# Patient Record
Sex: FEMALE | Race: WHITE
[De-identification: names, ages, dates, MRNs, and addresses within clinical notes are randomized per-mention and may not be internally consistent; named-entity substitution may affect disease eponyms.]

---

## 2012-06-17 VITALS — DIASTOLIC BLOOD PRESSURE: 75 MMHG | SYSTOLIC BLOOD PRESSURE: 120 MMHG

## 2018-05-19 NOTE — RAD REPORT
EXAM DESCRIPTION:  US - Abdomen Exam Limited - 5/19/2018 7:38 am

 

CLINICAL HISTORY:  Right upper quadrant pain, abnormal CT study

 

COMPARISON:  CT abdomen May 19th

 

FINDINGS:  Gallbladder is well filled but not abnormally distended. Several small mobile gallstones a
re present under 1 centimeter in size. Gallbladder wall is upper normal to slightly thickened. The tr
ace pericholecystic fluid seen at CT imaging cannot be confirmed on this study. No gallstone is confi
rmed as being fixed in the neck.

 

No common duct stone or biliary tree dilatation identified.

 

IMPRESSION:  Multiple small sub centimeter mobile gallstones are present.

 

Upper normal to slightly thickened gallbladder wall. Pericholecystic fluid seen at CT imaging is not 
confirmed at sonography.

 

No biliary tree abnormality.

## 2018-05-19 NOTE — RAD REPORT
EXAM DESCRIPTION:  RAD - Cholangiogram Oper-Xray Or - 5/19/2018 2:15 pm

 

FINDINGS:  Portable C-arm views were obtained during intraoperative cholangiogram.

 

Biliary tree is not dilated. No duct stone, stricture or mass seen on limited evaluation. Contrast in
 the gallbladder fossa is possibly due to technical factors of injection rather than biloma or bile l
eak. Gallbladder is presumed to the already been removed.

 

Overall assessment is limited when only a single images available for review. Correlation is needed w
ith findings during real-time evaluation.

## 2018-05-19 NOTE — P.OP
Preoperative diagnosis: Acute on chronic cholecystitis with cholelithiasis


Postoperative diagnosis: The same


Primary procedure: Laparoscopic cholecystectomy


Secondary procedure: Cholangiogram


Anesthesia: General


Estimated blood loss: Less than 10 cc


Specimen: 1 gallbladder incontinence


Operative Technique: 





The patient brought the operating room and placed supine on the table.  After 

the induction of adequate general endotracheal anesthesia, the area of the 

abdomen was prepped with a DuraPrep solution and she was draped in usual 

aseptic manner.





A subumbilical incision was made.  This brought down through the skin and 

subcutaneous tissue.  The tissue port was used to enter the peritoneal cavity 

and created pneumoperitoneum to approximately 12 mm of mercury.  Under direct 

vision a 5 mm trocar was placed in the midline, and 2 others on the right 

lateral of the abdominal wall.  With the patient placed in reverse 

Trendelenburg and rolled to the left were able to visualize right upper 

quadrant.  We could see a distended acutely inflamed gallbladder.  There was 

obvious edema in the walls of the gallbladder.  A grasper was placed on the 

fundus, and another down by Erick's pouch.  Once again there was a marked 

amount of edema around Erick's pouch.  The peritoneum was opened this area 

gently dissected off the surrounding structures.  Having applied lateral 

traction obtained no critical view the cystic duct was clipped between the 

gallbladder and the cystic duct.  An opening was made into the cystic duct 

through which we obtained a normal intraoperative cholangiogram.  The catheter 

was removed.  The cystic was secured with 2 clips placed using the Endo clips 

device.  The cystic duct was now fully transected.  The cystic artery was 

clipped and divided in the usual way.  The gallbladder was now dissected from 

the liver bed, placed into an Endo-Catch, and brought out through the umbilical 

trocar site.  At this point the abdomen is inspected to ensure adequate 

hemostasis.  Was returned to the neutral position on the OR table.  The 

umbilical trocar site was approximated using the Endo Close an absorbable 

stitch.  The irrigating fluid was aspirated from the right upper quadrant.  The 

pneumoperitoneum was collapsed, the suture tied, and staples applied to the 

skin.





At the end of the procedure she was stable when sent to the recovery room.  

Needle sponge instrument count were correct.  No drains were placed.


Complications: None


Transferred to: Recovery Room


Condition: Good

## 2018-05-19 NOTE — EDPHYS
Physician Documentation                                                                           

 [facility]                                                                

Name: Lina Cahparro                                                                               

Age: 42 yrs                                                                                       

Sex: Female                                                                                       

: 1975                                                                                   

MRN: [id number]                                                                                   

Arrival Date: 2018                                                                          

Time: 03:38                                                                                       

Account#: [id number]                                                                            

Bed 7                                                                                             

Private MD:                                                                                       

ED Physician Dylan Karimi                                                                      

HPI:                                                                                              

                                                                                             

05:07 This 42 yrs old  Female presents to ER via Ambulatory with complaints of       valery 

      Abdominal Pain, Back Pain.                                                                  

05:07 The patient presents with pain that is acute.                                           valery 

                                                                                                  

OB/GYN:                                                                                           

03:48 LMP 2018                                                                            tl1 

                                                                                                  

Historical:                                                                                       

- Allergies:                                                                                      

03:51 No Known Allergies;                                                                     tl1 

- Home Meds:                                                                                      

03:51 None [Active];                                                                          tl1 

- PMHx:                                                                                           

03:51 None;                                                                                   tl1 

- PSHx:                                                                                           

03:51 Tubal ligation;                                                                         tl1 

                                                                                                  

- Immunization history:: Adult Immunizations up to date.                                          

- Social history:: Smoking status: Patient uses tobacco products, smokes one pack                 

  cigarettes per day. Patient uses alcohol, occasionally.                                         

                                                                                                  

                                                                                                  

ROS:                                                                                              

05:08 Constitutional: Negative for fever, chills, and weight loss, Eyes: Negative for injury, valery 

      pain, redness, and discharge, ENT: Negative for injury, pain, and discharge, Neck:          

      Negative for injury, pain, and swelling, Cardiovascular: Negative for chest pain,           

      palpitations, and edema, Respiratory: Negative for shortness of breath, cough,              

      wheezing, and pleuritic chest pain, Back: Negative for injury and pain, : Negative        

      for injury, bleeding, discharge, and swelling, MS/Extremity: Negative for injury and        

      deformity, Skin: Negative for injury, rash, and discoloration, Neuro: Negative for          

      headache, weakness, numbness, tingling, and seizure, Psych: Negative for depression,        

      anxiety, suicide ideation, homicidal ideation, and hallucinations, Allergy/Immunology:      

      Negative for hives, rash, and allergies, Endocrine: Negative for neck swelling,             

      polydipsia, polyuria, polyphagia, and marked weight changes.                                

05:08 Abdomen/GI: Positive for abdominal pain, of the epigastric area, right upper quadrant       

      and left upper quadrant.                                                                    

                                                                                                  

Exam:                                                                                             

05:08 Constitutional:  This is a well developed, well nourished patient who is awake, alert,  valery 

      and in no acute distress. Head/Face:  Normocephalic, atraumatic. Eyes:  Pupils equal        

      round and reactive to light, extra-ocular motions intact.  Lids and lashes normal.          

      Conjunctiva and sclera are non-icteric and not injected.  Cornea within normal limits.      

      Periorbital areas with no swelling, redness, or edema. ENT:  Nares patent. No nasal         

      discharge, no septal abnormalities noted.  Tympanic membranes are normal and external       

      auditory canals are clear.  Oropharynx with no redness, swelling, or masses, exudates,      

      or evidence of obstruction, uvula midline.  Mucous membranes moist. Neck:  Trachea          

      midline, no thyromegaly or masses palpated, and no cervical lymphadenopathy.  Supple,       

      full range of motion without nuchal rigidity, or vertebral point tenderness.  No            

      Meningismus. Chest/axilla:  Normal chest wall appearance and motion.  Nontender with no     

      deformity.  No lesions are appreciated. Cardiovascular:  Regular rate and rhythm with a     

      normal S1 and S2.  No gallops, murmurs, or rubs.  Normal PMI, no JVD.  No pulse             

      deficits. Respiratory:  Lungs have equal breath sounds bilaterally, clear to                

      auscultation and percussion.  No rales, rhonchi or wheezes noted.  No increased work of     

      breathing, no retractions or nasal flaring. Back:  No spinal tenderness.  No                

      costovertebral tenderness.  Full range of motion. Female :  Normal external               

      genitalia. Skin:  Warm, dry with normal turgor.  Normal color with no rashes, no            

      lesions, and no evidence of cellulitis. MS/ Extremity:  Pulses equal, no cyanosis.          

      Neurovascular intact.  Full, normal range of motion. Neuro:  Awake and alert, GCS 15,       

      oriented to person, place, time, and situation.  Cranial nerves II-XII grossly intact.      

      Motor strength 5/5 in all extremities.  Sensory grossly intact.  Cerebellar exam            

      normal.  Normal gait. Psych:  Awake, alert, with orientation to person, place and time.     

       Behavior, mood, and affect are within normal limits.                                       

05:08 Abdomen/GI: Inspection: abdomen appears normal, Bowel sounds: normal, Palpation: mild       

      abdominal tenderness, moderate abdominal tenderness, in the epigastric area, right          

      upper quadrant and left upper quadrant.                                                     

                                                                                                  

Vital Signs:                                                                                      

03:48  / 83; Pulse 74; Resp 17; Temp 97.6; Pulse Ox 97% ; Weight 72.57 kg; Height 5 ft. tl1 

      7 in. (170.18 cm); Pain 5/10;                                                               

05:00  / 79; Pulse 78; Resp 17; Pulse Ox 100% ; Pain 8/10;                              tl1 

05:43  / 81; Pulse 64; Resp 17; Pulse Ox 100% ; Pain 0/10;                              tl1 

06:19  / 72; Pulse 56; Resp 16; Temp 97.4; Pulse Ox 100% ; Pain 0/10;                   tl1 

07:20  / 71; Pulse 54; Resp 16; Pulse Ox 99% ;                                          jl7 

03:48 Body Mass Index 25.06 (72.57 kg, 170.18 cm)                                             tl1 

                                                                                                  

MDM:                                                                                              

04:24 Patient medically screened.                                                             [facility] 

                                                                                                  

                                                                                             

03:51 Order name: Amylase, Serum; Complete Time: 05:04                                        [facility] 

                                                                                             

03:51 Order name: Basic Metabolic Panel; Complete Time: 05:04                                 [facility] 

                                                                                             

03:51 Order name: CBC with Diff; Complete Time: 05:04                                         [facility] 

                                                                                             

03:51 Order name: Hepatic Function; Complete Time: 05:04                                      [facility] 

                                                                                             

03:51 Order name: Lipase; Complete Time: 05:04                                                [facility] 

                                                                                             

03:51 Order name: Urine Microscopic Only; Complete Time: 05:04                                [facility] 

                                                                                             

04:09 Order name: Urine Dipstick--Ancillary (enter results); Complete Time: 05:04               

                                                                                             

04:09 Order name: Urine Pregnancy--Ancillary (enter results); Complete Time: 05:04              

                                                                                             

05:15 Order name: Basic Metabolic Panel                                                       [facility]

                                                                                             

05:15 Order name: Basic Metabolic Panel                                                       [facility]

                                                                                             

05:15 Order name: CBC with Automated Diff                                                     [facility]

                                                                                             

05:15 Order name: CBC with Automated Diff                                                     [facility]

                                                                                             

05:15 Order name: Lipase                                                                      [facility]

                                                                                             

03:51 Order name: IV Saline Lock; Complete Time: 03:56                                        [facility] 

                                                                                             

05:07 Order name: CT Abd/Pelvis - W/Contrast                                                  [facility] 

                                                                                             

05:07 Order name: US Abdomen Limited                                                          [facility] 

                                                                                             

05:15 Order name: NPO                                                                         [facility]

                                                                                             

05:15 Order name: Lipase                                                                      [facility]

                                                                                             

05:15 Order name: Liver (Hepatic) Function                                                    [facility]

                                                                                             

05:15 Order name: Liver (Hepatic) Function                                                    [facility]

                                                                                             

03:51 Order name: Labs collected and sent; Complete Time: 03:56                               tl1 

                                                                                                  

Administered Medications:                                                                         

05:08 CANCELLED (not available): Zofran 4 mg IVP once; over 2 minutes                         tl1 

05:08 CANCELLED (Duplicate Order): NS 0.9% 1000 ml IV at 1000 ml once                         tl1 

05:08 CANCELLED (Duplicate Order): morphine 4 mg IVP once                                     tl1 

05:09 Drug: NS 0.9% 1000 ml Route: IV; Rate: 1 bolus; Site: right antecubital;                tl1 

06:09 Follow up: IV Status: Completed infusion                                                tl1 

05:09 Drug: Phenergan 12.5 mg Route: IVP; Infused Over: 3 mins; Site: right antecubital;      tl1 

05:44 Follow up: Response: No adverse reaction; Marked relief of symptoms; Nausea is decreasedtl1 

05:10 Drug: morphine 4 mg Route: IVP; Site: right antecubital;                                tl1 

05:45 Follow up: Response: No adverse reaction; Marked relief of symptoms; Pain is decreased  tl1 

05:16 Drug: Pepcid 20 mg Route: IVP; Infused Over: 3 mins; Site: right antecubital;           tl1 

05:45 Follow up: Response: No adverse reaction; No change in condition                        tl1 

05:16 Drug: Zosyn 3.375 grams Route: IVPB; Infused Over: 60 mins; Site: right antecubital;    tl1 

06:09 Follow up: IV Status: Completed infusion                                                tl1 

06:18 Drug: NS 0.9% 1000 ml Route: IV; Rate: 125 ml/hr; Site: right antecubital;              tl1 

06:21 Follow up: IV Status: Infusion continued upon admission                                 tl1 

                                                                                                  

                                                                                                  

Disposition:                                                                                      

18 05:10 Hospitalization ordered by Bill Hairston for Observation. Preliminary             

  diagnosis are Abdominal tenderness, Upper abdominal pain, unspecified.                          

- Bed requested for Telemetry/MedSurg (Inpatient).                                                

- Status is Observation.                                                                      jl7 

- Condition is Fair.                                                                              

- Problem is new.                                                                                 

- Symptoms have improved.                                                                         

UTI on Admission? No                                                                              

                                                                                                  

                                                                                                  

                                                                                                  

Signatures:                                                                                       

Dispatcher [employer]                           EDMS                                                 

Danna Rogers RN RN kl Anderson, Corey, MD MD cha Lasagna, Tonya, RN RN   tl1                                                  

Karl Orta RN RN   jl7                                                  

                                                                                                  

Corrections: (The following items were deleted from the chart)                                    

05:07 03:52 Creatinine for Radiology+C.LAB.BRZ ordered. EDMS                                  EDMS

05:08 05:07 Zofran 4 mg IVP once; over 2 minutes ordered. valery                                 tl1 

05:08 05:07 NS 0.9% 1000 ml IV at 1000 ml once ordered. tl1                                   tl1 

05:08 05:07 morphine 4 mg IVP once ordered. tl1                                               tl1 

06:03 05:10 Hospitalization Ordered by Bill Hairston MD for Observation. Preliminary          kl  

      diagnosis is Abdominal tenderness; Upper abdominal pain, unspecified. Bed requested for     

      Telemetry/MedSurg (Inpatient). Status is Observation. Condition is Fair. Problem is         

      new. Symptoms have improved. UTI on Admission? No. valery                                      

08:00 06:03 2018 05:10 Hospitalization Ordered by Bill Hairston MD for Observation.     jl7 

      Preliminary diagnosis is Abdominal tenderness; Upper abdominal pain, unspecified. Bed       

      requested for Telemetry/MedSurg (Inpatient). Status is Observation. Condition is Fair.      

      Problem is new. Symptoms have improved. UTI on Admission? No. kl                            

                                                                                                  

**************************************************************************************************

## 2018-05-19 NOTE — RAD REPORT
EXAM DESCRIPTION:  CT - Abdomen   Pelvis W Contrast - 5/19/2018 6:42 am

 

CLINICAL HISTORY:  Abdominal pain

 

 A preliminary written report was provided at the time of the study, and the report was reviewed prio
r to final dictation.

 

COMPARISON:  None.

 

TECHNIQUE:  Biphasic, helical CT imaging of the abdomen and pelvis was performed following 100 ml non
-ionic IV contrast. No oral contrast was given.

 

All CT scans are performed using dose optimization technique as appropriate and may include automated
 exposure control or mA/KV adjustment according to patient size.

 

FINDINGS:  No suspicious findings in the lung bases.

 

The liver, spleen, and pancreas show no suspicious findings. Gallbladder is well filled but not dilat
ed. Gallbladder wall is minimally edematous and there is a trace amount of pericholecystic fluid. No 
biliary tree dilatation.

 

Symmetric renal function is seen with no hydronephrosis or suspicious renal mass. No pyelonephritis o
r acute renal parenchymal process. Urinary bladder, uterus and ovaries show no suspicious findings.

 

Food fills but does not abnormally distend the stomach. No true gastric wall thickening or mass suspe
cted. Multiple fluid filled but nondilated small bowel loops are present. Moderate stool volume is pr
esent without an acute colon process seen. No appendicitis findings.

 

  No free air or pneumatosis. No focal inflammatory stranding. Trace amount of fluid in the cul-de-sa
c is within physiologic limits.  No hernia, mass or bulky lymphadenopathy. No adrenal abnormality.

 

No suspicious bony findings.

 

 

IMPRESSION:  Gallbladder is well filled but not dilated. There is trace pericholecystic fluid and que
stionable gallbladder wall edema. Gallstones can be occult.

 

Biliary tree and pancreas are unremarkable. No other right upper quadrant finding.

 

Fluid-filled small bowel loops are present. These are not clearly abnormal although a small bowel ent
eritis would still be possible.

## 2018-05-19 NOTE — ER
Nurse's Notes                                                                                     

 [facility]                                                                

Name: Lina Chaparro                                                                               

Age: 42 yrs                                                                                       

Sex: Female                                                                                       

: 1975                                                                                   

MRN: [id number]                                                                                   

Arrival Date: 2018                                                                          

Time: 03:38                                                                                       

Account#: [id number]                                                                            

Bed 7                                                                                             

Private MD:                                                                                       

Diagnosis: Abdominal tenderness;Upper abdominal pain, unspecified                                 

                                                                                                  

Presentation:                                                                                     

                                                                                             

03:47 Presenting complaint: Patient states: After dinner tonight I got pain in my upper       tl1 

      stomach that radiated around my right back. Transition of care: patient was not             

      received from another setting of care. Onset of symptoms was May 19, 2018. Initial          

      Sepsis Screen: Does the patient meet any 2 criteria? No. Patient's initial sepsis           

      screen is negative. Does the patient have a suspected source of infection? No.              

      Patient's initial sepsis screen is negative. Care prior to arrival: None.                   

03:47 Method Of Arrival: Ambulatory                                                           tl1 

03:47 Acuity: ARAMIS 3                                                                           tl1 

                                                                                                  

OB/GYN:                                                                                           

03:48 LMP 2018                                                                            tl1 

                                                                                                  

Historical:                                                                                       

- Allergies:                                                                                      

03:51 No Known Allergies;                                                                     tl1 

- Home Meds:                                                                                      

03:51 None [Active];                                                                          tl1 

- PMHx:                                                                                           

03:51 None;                                                                                   tl1 

- PSHx:                                                                                           

03:51 Tubal ligation;                                                                         tl1 

                                                                                                  

- Immunization history:: Adult Immunizations up to date.                                          

- Social history:: Smoking status: Patient uses tobacco products, smokes one pack                 

  cigarettes per day. Patient uses alcohol, occasionally.                                         

                                                                                                  

                                                                                                  

Screenin:54 Abuse screen: Denies threats or abuse. Denies injuries from another. Nutritional        tl1 

      screening: No deficits noted. Tuberculosis screening: No symptoms or risk factors           

      identified. Fall Risk IV access (20 points).                                                

                                                                                                  

Assessment:                                                                                       

03:52 General: Appears in no apparent distress. Behavior is calm, cooperative, appropriate    tl1 

      for age. Pain: Complains of pain in epigastric area Pain radiates to back Pain              

      currently is 5 out of 10 on a pain scale. Quality of pain is described as burning.          

      Neuro: Level of Consciousness is awake, alert, obeys commands, Oriented to person,          

      place, time, situation. Cardiovascular: Denies chest pain. Respiratory: Airway is           

      patent Trachea midline Respiratory effort is even, unlabored, Breath sounds are clear       

      bilaterally. GI: Abdomen is non-distended, Bowel sounds present X 4 quads. Abd is soft      

      X 4 quads Abd is non tender X 4 quads Reports upper abdominal pain. : No signs and/or     

      symptoms were reported regarding the genitourinary system. EENT: No signs and/or            

      symptoms were reported regarding the EENT system. Derm: No signs and/or symptoms            

      reported regarding the dermatologic system. Musculoskeletal: No signs and/or symptoms       

      reported regarding the musculoskeletal system.                                              

05:44 Reassessment: Patient and/or family updated on plan of care and expected duration. Pain tl1 

      level reassessed. Patient is alert, oriented x 3, equal unlabored respirations, skin        

      warm/dry/pink. Patient denies pain at this time. Patient states feeling better. Patient     

      states symptoms have improved.                                                              

                                                                                                  

Vital Signs:                                                                                      

03:48  / 83; Pulse 74; Resp 17; Temp 97.6; Pulse Ox 97% ; Weight 72.57 kg; Height 5 ft. tl1 

      7 in. (170.18 cm); Pain 5/10;                                                               

05:00  / 79; Pulse 78; Resp 17; Pulse Ox 100% ; Pain 8/10;                              tl1 

05:43  / 81; Pulse 64; Resp 17; Pulse Ox 100% ; Pain 0/10;                              tl1 

06:19  / 72; Pulse 56; Resp 16; Temp 97.4; Pulse Ox 100% ; Pain 0/10;                   tl1 

07:20  / 71; Pulse 54; Resp 16; Pulse Ox 99% ;                                          jl7 

03:48 Body Mass Index 25.06 (72.57 kg, 170.18 cm)                                             tl1 

                                                                                                  

ED Course:                                                                                        

03:38 Patient arrived in ED.                                                                  es  

03:46 Karma Wharton, RN is Primary Nurse.                                                    tl1 

03:48 Triage completed.                                                                       tl1 

03:51 Arm band placed on right wrist.                                                         tl1 

03:51 Patient has correct armband on for positive identification. Bed in low position. Call   tl1 

      light in reach. Side rails up X 1. Adult w/ patient.                                        

03:56 Inserted saline lock: 20 gauge in right antecubital area, using aseptic technique.      tl2 

      Blood collected.                                                                            

04:03 Dylan Karimi MD is Attending Physician.                                             valery 

05:09 Bill Hairston MD is Hospitalizing Provider.                                           valery 

05:31 Patient moved to CT via stretcher.                                                      nj  

05:34 CT completed. Patient tolerated procedure well. Patient moved back from CT.             nj  

06:20 No provider procedures requiring assistance completed. Patient admitted, IV remains in  tl1 

      place.                                                                                      

07:38 Ultrasound completed. Patient tolerated well.                                           sg3 

                                                                                                  

Administered Medications:                                                                         

05:08 CANCELLED (not available): Zofran 4 mg IVP once; over 2 minutes                         tl1 

05:08 CANCELLED (Duplicate Order): NS 0.9% 1000 ml IV at 1000 ml once                         tl1 

05:08 CANCELLED (Duplicate Order): morphine 4 mg IVP once                                     tl1 

05:09 Drug: NS 0.9% 1000 ml Route: IV; Rate: 1 bolus; Site: right antecubital;                tl1 

06:09 Follow up: IV Status: Completed infusion                                                tl1 

05:09 Drug: Phenergan 12.5 mg Route: IVP; Infused Over: 3 mins; Site: right antecubital;      tl1 

05:44 Follow up: Response: No adverse reaction; Marked relief of symptoms; Nausea is decreasedtl1 

05:10 Drug: morphine 4 mg Route: IVP; Site: right antecubital;                                tl1 

05:45 Follow up: Response: No adverse reaction; Marked relief of symptoms; Pain is decreased  tl1 

05:16 Drug: Pepcid 20 mg Route: IVP; Infused Over: 3 mins; Site: right antecubital;           tl1 

05:45 Follow up: Response: No adverse reaction; No change in condition                        tl1 

05:16 Drug: Zosyn 3.375 grams Route: IVPB; Infused Over: 60 mins; Site: right antecubital;    tl1 

06:09 Follow up: IV Status: Completed infusion                                                tl1 

06:18 Drug: NS 0.9% 1000 ml Route: IV; Rate: 125 ml/hr; Site: right antecubital;              tl1 

06:21 Follow up: IV Status: Infusion continued upon admission                                 tl1 

                                                                                                  

                                                                                                  

Outcome:                                                                                          

05:10 Decision to Hospitalize by Provider.                                                    valery 

07:46 Admitted to Med/surg accompanied by tech, via wheelchair, room 430, with chart, Report  jl7 

      called to  MYLES Gooden                                                                        

07:46 Condition: stable                                                                           

07:46 Discharge instructions given to patient, Instructed on the need for admit, Demonstrated     

      understanding of instructions.                                                              

08:00 Patient left the ED.                                                                    jl7 

                                                                                                  

Signatures:                                                                                       

Dylan Karimi MD MD cha Salyer, Edna es Lasagna, Tonya, RN                      RN   tl1                                                  

Alma Booth RN                        RN   tl2                                                  

Randolph Lane Jahala, RN                        RN   jl7                                                  

Blanka Recio                               sg3                                                  

                                                                                                  

**************************************************************************************************

## 2019-02-10 ENCOUNTER — HOSPITAL ENCOUNTER (EMERGENCY)
Dept: HOSPITAL 97 - ER | Age: 44
Discharge: ANOTHER HOSPITAL | End: 2019-02-10
Payer: COMMERCIAL

## 2019-02-10 VITALS — TEMPERATURE: 97.2 F | OXYGEN SATURATION: 100 %

## 2019-02-10 VITALS — DIASTOLIC BLOOD PRESSURE: 65 MMHG | SYSTOLIC BLOOD PRESSURE: 131 MMHG

## 2019-02-10 DIAGNOSIS — R53.1: ICD-10-CM

## 2019-02-10 DIAGNOSIS — R29.810: ICD-10-CM

## 2019-02-10 DIAGNOSIS — R29.702: ICD-10-CM

## 2019-02-10 DIAGNOSIS — I63.9: Primary | ICD-10-CM

## 2019-02-10 LAB
BUN BLD-MCNC: 8 MG/DL (ref 7–18)
GLUCOSE SERPLBLD-MCNC: 118 MG/DL (ref 74–106)
HCT VFR BLD CALC: 42.7 % (ref 36–45)
INR BLD: 1
LYMPHOCYTES # SPEC AUTO: 2.2 K/UL (ref 0.7–4.9)
PMV BLD: 7.9 FL (ref 7.6–11.3)
POTASSIUM SERPL-SCNC: 3.5 MMOL/L (ref 3.5–5.1)
RBC # BLD: 4.64 M/UL (ref 3.86–4.86)

## 2019-02-10 PROCEDURE — 71045 X-RAY EXAM CHEST 1 VIEW: CPT

## 2019-02-10 PROCEDURE — 81003 URINALYSIS AUTO W/O SCOPE: CPT

## 2019-02-10 PROCEDURE — 36415 COLL VENOUS BLD VENIPUNCTURE: CPT

## 2019-02-10 PROCEDURE — 81025 URINE PREGNANCY TEST: CPT

## 2019-02-10 PROCEDURE — 93005 ELECTROCARDIOGRAM TRACING: CPT

## 2019-02-10 PROCEDURE — 80048 BASIC METABOLIC PNL TOTAL CA: CPT

## 2019-02-10 PROCEDURE — 99285 EMERGENCY DEPT VISIT HI MDM: CPT

## 2019-02-10 PROCEDURE — 85025 COMPLETE CBC W/AUTO DIFF WBC: CPT

## 2019-02-10 PROCEDURE — 82962 GLUCOSE BLOOD TEST: CPT

## 2019-02-10 PROCEDURE — 85610 PROTHROMBIN TIME: CPT

## 2019-02-10 PROCEDURE — 85730 THROMBOPLASTIN TIME PARTIAL: CPT

## 2019-02-10 PROCEDURE — 70450 CT HEAD/BRAIN W/O DYE: CPT

## 2019-02-10 NOTE — EKG
Test Date:    2019-02-10               Test Time:    16:29:06

Technician:                                       

                                                     

MEASUREMENT RESULTS:                                       

Intervals:                                           

Rate:         90                                     

AR:           140                                    

QRSD:         90                                     

QT:           386                                    

QTc:          472                                    

Axis:                                                

P:            57                                     

AR:           140                                    

QRS:          22                                     

T:            11                                     

                                                     

INTERPRETIVE STATEMENTS:                                       

                                                     

Normal sinus rhythm

Nonspecific ST abnormality

Abnormal ECG

No previous ECG available for comparison



Electronically Signed On 02-10-19 21:49:28 CST by Nicholas Gomez

## 2019-02-10 NOTE — EDPHYS
Physician Documentation                                                                           

 [facility]                                                                

Name: Lina Chaparro                                                                               

Age: 43 yrs                                                                                       

Sex: Female                                                                                       

: 1975                                                                                   

MRN: [id number]                                                                                   

Arrival Date: 02/10/2019                                                                          

Time: 15:57                                                                                       

Account#: [id number]                                                                            

Bed 8                                                                                             

Private MD: None, None                                                                            

ED Physician Sloan Calles                                                                         

HPI:                                                                                              

02/10                                                                                             

17:28 This 43 yrs old  Female presents to ER via Ambulatory with complaints of       rn  

      Numbness Of Arm.                                                                            

17:29 The patient presents to the emergency department with paresthesias of the. Onset: The   rn  

      symptoms/episode began/occurred at 15:15. Context: occurred at home. Severity of            

      symptoms: At their worst the symptoms were moderate in the emergency department the         

      symptoms have improved. Current symptoms: paralysis or paresis. The patient has not         

      experienced similar symptoms in the past. Reports onset at 1515, left facial numbness       

      and left arm numb, left leg numb and feels heavy, symptoms have improved but not            

      totally resolved. NO trauma, no chest pain. .                                               

                                                                                                  

Historical:                                                                                       

- Allergies:                                                                                      

16:23 No Known Allergies;                                                                     hb  

- Home Meds:                                                                                      

16:23 None [Active];                                                                          hb  

- PMHx:                                                                                           

16:23 None;                                                                                   hb  

- PSHx:                                                                                           

16:23 Tubal ligation;                                                                         hb  

                                                                                                  

- Immunization history:: Adult Immunizations up to date.                                          

- Social history:: Smoking status: Patient/guardian denies using tobacco.                         

- Ebola Screening: : No symptoms or risks identified at this time.                                

- Family history:: not pertinent.                                                                 

- Hospitalizations: : No recent hospitalization is reported.                                      

                                                                                                  

                                                                                                  

ROS:                                                                                              

17:29 Constitutional: Negative for fever, chills, and weight loss, Eyes: Negative for injury, rn  

      pain, redness, and discharge, Cardiovascular: Negative for chest pain, palpitations,        

      and edema, Respiratory: Negative for shortness of breath, cough, wheezing, and              

      pleuritic chest pain, Abdomen/GI: Negative for abdominal pain, nausea, vomiting,            

      diarrhea, and constipation, MS/Extremity: Negative for injury and deformity, Skin:          

      Negative for injury, rash, and discoloration, Neuro: + numbness and weakness                

                                                                                                  

Exam:                                                                                             

17:29 Constitutional:  This is a well developed, well nourished patient who is awake, alert,  rn  

      appears anxious Head/Face:  Normocephalic, atraumatic. Eyes:  Pupils equal round and        

      reactive to light, extra-ocular motions intact.  Lids and lashes normal.  Conjunctiva       

      and sclera are non-icteric and not injected.  Cornea within normal limits.  Periorbital     

      areas with no swelling, redness, or edema. Cardiovascular:  Regular rate and rhythm.        

      No pulse deficits. Respiratory:  Lungs have equal breath sounds bilaterally, clear to       

      auscultation.  No increased work of breathing, no retractions or nasal flaring.             

      Abdomen/GI:  soft, non-tender Skin:  Warm, dry with normal turgor.  Normal color with       

      no rashes, no lesions, and no evidence of cellulitis. MS/ Extremity:  Pulses equal, no      

      cyanosis.  Neurovascular intact.  Full, normal range of motion.  Equal circumference.       

      Neuro:  Awake and alert, GCS 15, oriented to person, place, time, and situation.  +         

      mild left lower facial droop, + paresthesias to soft touch LUE, normal strength without     

      drift.                                                                                      

                                                                                                  

Vital Signs:                                                                                      

16:08  / 78; Pulse 99; Resp 16; Temp 97.2; Pulse Ox 100% on R/A; Pain 0/10;             hb  

16:30 Weight 88.8 kg (M);                                                                     ss  

16:30  / 88; Pulse 77; Resp 12; Pulse Ox 100% ;                                         sv  

17:00  / 80; Pulse 79; Resp 14; Pulse Ox 100% ;                                         sv  

17:30  / 71; Pulse 70; Resp 14; Pulse Ox 100% ;                                         sv  

18:07  / 65; Pulse 77; Resp 12; Pulse Ox 100% ;                                         sv  

                                                                                                  

NIH Stroke Scale Scores:                                                                          

16:18 NIHSS Score: 1                                                                          sv  

16:30 NIHSS Score: 1                                                                          rn  

16:58 NIHSS Score: 2                                                                          rn  

                                                                                                  

MDM:                                                                                              

16:16 Patient medically screened.                                                             rn  

16:30 ED course: NIH scale 1, onset was 1 hour prior to arrival, symptoms present and         rn  

      consented for TPA. CT head no acute finding per radiology. .                                

16:58 ED course: Correction: NIH scale 2, for minor facial paralysis. Patient still does not  rn  

      want TPA, understands implications..                                                        

17:18 ED course: Pt accepted for transfer to [facility] by Dr. Yanes, confirmed again, does    rn  

      not want TPA. .                                                                             

17:29 Data reviewed: vital signs, nurses notes, lab test result(s), EKG, radiologic studies,  rn  

      and as a result, I will admit patient. Counseling: I had a detailed discussion with the     

      patient and/or guardian regarding: the historical points, exam findings, and any            

      diagnostic results supporting the discharge/admit diagnosis, lab results, radiology         

      results, the need to transfer to another facility, [facility] does        

      not immediately have the required specialist.                                               

                                                                                                  

02/10                                                                                             

16:26 Order name: Basic Metabolic Panel; Complete Time: 16:52                                 em1 

02/10                                                                                             

16:26 Order name: CBC with Diff; Complete Time: 16:52                                         em1 

02/10                                                                                             

16:26 Order name: Protime (+inr); Complete Time: 16:52                                        em1 

02/10                                                                                             

16:26 Order name: Ptt, Activated; Complete Time: 16:52                                        em1 

02/10                                                                                             

16:58 Order name: Urine Dipstick--Ancillary (enter results)                                   em1 

02/10                                                                                             

16:58 Order name: Urine Pregnancy--Ancillary (enter results)                                  em1 

02/10                                                                                             

16:26 Order name: CT Stroke Brain w/o Contrast; Complete Time: 17:17                          em1 

02/10                                                                                             

16:26 Order name: Stroke CXR 1 View; Complete Time: 17:17                                     em1 

02/10                                                                                             

16:26 Order name: EKG; Complete Time: 16:27                                                   em1 

02/10                                                                                             

16:26 Order name: Accucheck; Complete Time: 16:42                                             em1 

02/10                                                                                             

16:26 Order name: Cardiac monitoring; Complete Time: 16:42                                    em1 

02/10                                                                                             

16:26 Order name: EKG - Nurse/Tech; Complete Time: 16:43                                      em1 

02/10                                                                                             

16:26 Order name: IV Saline Lock; Complete Time: 16:32                                        em1 

02/10                                                                                             

16:26 Order name: Labs collected and sent; Complete Time: 16:32                               em1 

02/10                                                                                             

16:26 Order name: NPO; Complete Time: 16:32                                                   em1 

02/10                                                                                             

16:26 Order name: O2 Per Protocol; Complete Time: 16:32                                       em1 

02/10                                                                                             

16:26 Order name: O2 Sat Monitoring; Complete Time: 16:31                                     em1 

02/10                                                                                             

16:26 Order name: Stroke Swallow Screen; Complete Time: 18:00                                 em 

                                                                                                  

Administered Medications:                                                                         

17:22 Not Given (Patient Refused): ACTIvase IV Thrombolytics at calculated rate Per protocol  rn  

      over 60 mins; 0.9 mg/kg IV (Max: 90 mg); give 10% of the total dose as an IV bolus over     

      1 minute, then give the remaining 90% as an IV infusion over 60 minutes                     

18:39 Drug: PlaVIX 75 mg Route: PO;                                                             

18:39 Follow up: Response: Medication administered at discharge.                                

                                                                                                  

                                                                                                  

Point of Care Testing:                                                                            

      Blood Glucose:                                                                              

16:09 Blood Glucose: 111 mg/dL;                                                               hb  

      Ranges:                                                                                     

      Critical Glucose Levels:Adult <50 mg/dl or >400 mg/dl  <40 mg/dl or >180 mg/dl       

Disposition:                                                                                      

02/10/19 17:32 Transfer ordered to [facility]. Diagnosis are Ischemic         

  stroke, Paresthesia of skin, Weakness.                                                          

- Reason for transfer: Higher level of care.                                                      

- Accepting physician is Dr. Yanes.                                                              

- Condition is Stable.                                                                            

- Problem is new.                                                                                 

- Symptoms have improved.                                                                         

                                                                                                  

                                                                                                  

                                                                                                  

                                                                                                  

NIH Stroke Scale - NIH Stroke Score                                                               

Date: 02/10/2019                                                                                  

Time: 16:18                                                                                       

Total Score = 1                                                                                   

  1a. Level of Consciousness (LOC) - 0(Alert)                                                     

  1b. Level of Consciousness (LOC) (Year \T\ Age) - 0(Both)                                       

  1c. LOC Commands (Open \T\ Closes Eyes/) - 0(Both)                                          

   2. Best Gaze (Lateral Gaze Paresis) - 0(Normal)                                                

   3. Visual Field Loss - 0(No visual loss)                                                       

   4. Facial Palsy - 0(Normal)                                                                    

  5a. Left Arm: Motor (10-second hold) - 0(No drift)                                              

  5b. Right Arm: Motor (10-second hold) - 0(No drift)                                             

  6a. Left Leg: Motor (5-second hold - always test supine) - 0(No drift)                          

  6b. Right Leg: Motor (5-second hold - always test supine) - 0(No drift)                         

   7. Limb Ataxia (finger/nose \T\ heel/shin - test with eyes open) - 0(Absent)                   

   8. Sensory Loss (pinprick arms/legs/face) - 1(Mild to moderate loss)                           

   9. Best Language: Aphasia (description/naming/reading) - 0(No aphasia)                         

  10. Dysarthria (speech clarity - read or repeat words) - 0(Normal)                              

  11. Extinction and Inattention (visual/tactile/auditory/spatial/personal) - 0(No                

      abnormality)                                                                                

Initials:                                                                                       

                                                                                                  

                                                                                                  

NIH Stroke Scale - NIH Stroke Score                                                               

Date: 02/10/2019                                                                                  

Time: 16:30                                                                                       

Total Score = 1                                                                                   

  1a. Level of Consciousness (LOC) - 0(Alert)                                                     

  1b. Level of Consciousness (LOC) (Year \T\ Age) - 0(Both)                                       

  1c. LOC Commands (Open \T\ Closes Eyes/) - 0(Both)                                          

   2. Best Gaze (Lateral Gaze Paresis) - 0(Normal)                                                

   3. Visual Field Loss - 0(No visual loss)                                                       

   4. Facial Palsy - 0(Normal)                                                                    

  5a. Left Arm: Motor (10-second hold) - 0(No drift)                                              

  5b. Right Arm: Motor (10-second hold) - 0(No drift)                                             

  6a. Left Leg: Motor (5-second hold - always test supine) - 0(No drift)                          

  6b. Right Leg: Motor (5-second hold - always test supine) - 0(No drift)                         

   7. Limb Ataxia (finger/nose \T\ heel/shin - test with eyes open) - 0(Absent)                   

   8. Sensory Loss (pinprick arms/legs/face) - 1(Mild to moderate loss)                           

   9. Best Language: Aphasia (description/naming/reading) - 0(No aphasia)                         

  10. Dysarthria (speech clarity - read or repeat words) - 0(Normal)                              

  11. Extinction and Inattention (visual/tactile/auditory/spatial/personal) - 0(No                

      abnormality)                                                                                

Initials: rn                                                                                      

                                                                                                  

                                                                                                  

NIH Stroke Scale - NIH Stroke Score                                                               

Date: 02/10/2019                                                                                  

Time: 16:58                                                                                       

Total Score = 2                                                                                   

  1a. Level of Consciousness (LOC) - 0(Alert)                                                     

  1b. Level of Consciousness (LOC) (Year \T\ Age) - 0(Both)                                       

  1c. LOC Commands (Open \T\ Closes Eyes/) - 0(Both)                                          

   2. Best Gaze (Lateral Gaze Paresis) - 0(Normal)                                                

   3. Visual Field Loss - 0(No visual loss)                                                       

   4. Facial Palsy - 1(Minor Paralysis)                                                           

  5a. Left Arm: Motor (10-second hold) - 0(No drift)                                              

  5b. Right Arm: Motor (10-second hold) - 0(No drift)                                             

  6a. Left Leg: Motor (5-second hold - always test supine) - 0(No drift)                          

  6b. Right Leg: Motor (5-second hold - always test supine) - 0(No drift)                         

   7. Limb Ataxia (finger/nose \T\ heel/shin - test with eyes open) - 0(Absent)                   

   8. Sensory Loss (pinprick arms/legs/face) - 1(Mild to moderate loss)                           

   9. Best Language: Aphasia (description/naming/reading) - 0(No aphasia)                         

  10. Dysarthria (speech clarity - read or repeat words) - 0(Normal)                              

  11. Extinction and Inattention (visual/tactile/auditory/spatial/personal) - 0(No                

      abnormality)                                                                                

Initials: rn                                                                                      

                                                                                                  

Signatures:                                                                                       

Dispatcher [employer]                           Priya Suarez RN RN                                                      

Sloan Callse MD MD rn Martinez, Eric                               [facility]                                                  

Meme Smith RN RN                                                      

                                                                                                  

Corrections: (The following items were deleted from the chart)                                    

18:54 17:32 02/10/2019 17:32 Transfer ordered to [facility].      sv          

      Diagnosis is Ischemic stroke; Paresthesia of skin; Weakness. Reason for                     

      transfer: Higher level of care. Accepting physician is Dr. Yanes. Condition is             

      Stable. Problem is new. Symptoms have improved. rn                                          

                                                                                                  

**************************************************************************************************

## 2019-02-10 NOTE — RAD REPORT
EXAM DESCRIPTION:  CT - Ct Stroke Brain Wo Cont - 2/10/2019 4:33 pm

 

CLINICAL HISTORY:  Left-sided numbness and tingling, stroke protocol study

 

CLINICAL HISTORY:  None.

 

TECHNIQUE:  Axial 5 millimeter thick images of the head were obtained without IV contrast.

 

All CT scans are performed using dose optimization technique as appropriate and may include automated
 exposure control or mA/KV adjustment according to patient size.

 

FINDINGS:  No intracranial hemorrhage, mass, or cerebral edema. No acute infarction identifiable. No 
extra-axial fluid collections.  Gray matter-white matter differentiation is preserved.

 

 

Visualized portions of the mastoid air cells, paranasal sinuses, and orbits are unremarkable.

 

Findings telephoned to the referring clinician 4:17 p.m.

 

IMPRESSION:  No CT evidence of acute intracranial process.

 

Continued concerns for acute CVA can be addressed with MR imaging.

## 2019-02-10 NOTE — ER
Nurse's Notes                                                                                     

 [facility]                                                                

Name: Lina Chaparro                                                                               

Age: 43 yrs                                                                                       

Sex: Female                                                                                       

: 1975                                                                                   

MRN: [id number]                                                                                   

Arrival Date: 02/10/2019                                                                          

Time: 15:57                                                                                       

Account#: [id number]                                                                            

Bed 8                                                                                             

Private MD: None, None                                                                            

Diagnosis: Ischemic stroke;Paresthesia of skin;Weakness                                           

                                                                                                  

Presentation:                                                                                     

02/10                                                                                             

16:08 Presenting complaint: Left sided facial numbness, left arm and top of left thigh        hb  

      numbness that started at 1515. - facial droop, - slurred speech, - arm drift, bilat         

       strong and equal, ambulated to triage with steady gait. Transition of care:           

      patient was not received from another setting of care. Onset of symptoms was February       

      10, 2019 at 15:15. Risk Assessment: Do you want to hurt yourself or someone else?           

      Patient reports no desire to harm self or others. Care prior to arrival: Medication(s)      

      given: ASA, 81 mg.                                                                          

16:08 Method Of Arrival: Ambulatory                                                           hb  

16:08 Acuity: ARAMIS 2                                                                           hb  

16:20 Initial Sepsis Screen: Does the patient meet any 2 criteria? No. Patient's initial      sv  

      sepsis screen is negative. Does the patient have a suspected source of infection? No.       

      Patient's initial sepsis screen is negative.                                                

                                                                                                  

Historical:                                                                                       

- Allergies:                                                                                      

16:23 No Known Allergies;                                                                     hb  

- Home Meds:                                                                                      

16:23 None [Active];                                                                          hb  

- PMHx:                                                                                           

16:23 None;                                                                                   hb  

- PSHx:                                                                                           

16:23 Tubal ligation;                                                                         hb  

                                                                                                  

- Immunization history:: Adult Immunizations up to date.                                          

- Social history:: Smoking status: Patient/guardian denies using tobacco.                         

- Ebola Screening: : No symptoms or risks identified at this time.                                

- Family history:: not pertinent.                                                                 

- Hospitalizations: : No recent hospitalization is reported.                                      

                                                                                                  

                                                                                                  

Screenin:22 Abuse screen: Denies threats or abuse. Denies injuries from another. Nutritional        hb  

      screening: No deficits noted. Tuberculosis screening: No symptoms or risk factors           

      identified. Fall Risk None identified.                                                      

16:25 Patient has been NPO before screening. The patient is alert, able to follow commands.   sv  

      The patient does not exhibit slurred or garbled speech The patient is not exhibiting        

      difficulty speaking. The patient does not exhibit difficulty understanding words. The       

      patient is able to swallow own secretions with no drooling or need for suction. Patient     

      tolerated one teaspoon of water. No drooling, immediate coughing, gurgling, or clearing     

      of the throat was noted. The patient tolerated 90mL of water. No drooling, immediate        

      coughing, gurgling, or clearing of the throat was noted. The patient passed the bedside     

      swallow screening. Oral medications may be given as ordered. Contact Physician for          

      further diet orders. Provider notified of bedside swallow screening results: Sloan Calles MD.                                                                                   

                                                                                                  

Assessment:                                                                                       

16:08 Reassessment: .                                                                  hb  

16:09 Reassessment: [facility] called, pt transported to CT via wheelchair with Meme BUENO.  hb  

16:15 Reassessment: Pt returned from CT.                                                      hb  

16:16 Reassessment: Dr. Calles at bedside.                                                     hb  

16:37 Reassessment: Pt had a change of mind about TPA administration. Initially after going   ss  

      over risks and possible benefits involved patient gave verbal consent for TPA               

      administration. When getting paper consent signed, patient stated she no longer would       

      like to have the TPA medication. Dr. Calles notified and is again at bedside.                

16:45 Reassessment: Dr Calles stated that pt is unable to think straight right now because she sv  

      has to urinate. Ok by Dr Calles to send pt to bathroom via wheelchair.                       

16:58 Reassessment: Dr Calles at bedside discussing with pt what her decision is. Pt does not  sv  

      want TPA at this time.                                                                      

17:30 Reassessment: Patient appears in no apparent distress at this time. No changes from     sv  

      previously documented assessment. Patient and/or family updated on plan of care and         

      expected duration. Pain level reassessed. Patient is alert, oriented x 3, equal             

      unlabored respirations, skin warm/dry/pink.                                                 

18:07 Reassessment: Report called to [facility], to Flavia BUENO.                      sv  

18:39 Reassessment: Patient appears in no apparent distress at this time. No changes from     sv  

      previously documented assessment. Patient and/or family updated on plan of care and         

      expected duration. Pain level reassessed. Patient is alert, oriented x 3, equal             

      unlabored respirations, skin warm/dry/pink.                                                 

                                                                                                  

Vital Signs:                                                                                      

16:08  / 78; Pulse 99; Resp 16; Temp 97.2; Pulse Ox 100% on R/A; Pain 0/10;             hb  

16:30 Weight 88.8 kg (M);                                                                     ss  

16:30  / 88; Pulse 77; Resp 12; Pulse Ox 100% ;                                         sv  

17:00  / 80; Pulse 79; Resp 14; Pulse Ox 100% ;                                         sv  

17:30  / 71; Pulse 70; Resp 14; Pulse Ox 100% ;                                         sv  

18:07  / 65; Pulse 77; Resp 12; Pulse Ox 100% ;                                         sv  

                                                                                                  

NIH Stroke Scale Scores:                                                                          

16:18 NIHSS Score: 1                                                                          sv  

16:30 NIHSS Score: 1                                                                          rn  

16:58 NIHSS Score: 2                                                                          rn  

                                                                                                  

ED Course:                                                                                        

15:57 Patient arrived in ED.                                                                  dl4 

15:57 None, None is Private Physician.                                                        dl4 

16:16 Sloan Calles MD is Attending Physician.                                                rn  

16:20 Initial lab(s) drawn, by ED staff, sent to lab. Inserted saline lock: 20 gauge in right sv  

      antecubital area, using aseptic technique. ,using aseptic technique. done by Mechelle BUENO      

      Blood collected.                                                                            

16:20 Patient has correct armband on for positive identification. Placed in gown. Bed in low  sv  

      position. Call light in reach. Side rails up X 1. Adult w/ patient. Cardiac monitor on.     

      Pulse ox on. NIBP on. Door closed. Warm blanket given. Head of bed elevated.                

16:20 Arm band placed on.                                                                     sv  

16:21 Triage completed.                                                                       hb  

16:33 CT Stroke Brain w/o Contrast In Process Unspecified.                                    EDMS

16:52 Priya Smith, MYLES is Primary Nurse.                                                  sv  

16:54 X-ray(s) taken.                                                                         sv  

16:55 X-ray completed. Portable x-ray completed in exam room. Patient tolerated procedure     sg4 

      well.                                                                                       

16:57 Stroke CXR 1 View In Process Unspecified.                                               EDMS

18:08 No provider procedures requiring assistance completed. Patient transferred, IV remains  sv  

      in place. intact.                                                                           

                                                                                                  

Administered Medications:                                                                         

17:22 Not Given (Patient Refused): ACTIvase IV Thrombolytics at calculated rate Per protocol  rn  

      over 60 mins; 0.9 mg/kg IV (Max: 90 mg); give 10% of the total dose as an IV bolus over     

      1 minute, then give the remaining 90% as an IV infusion over 60 minutes                     

18:39 Drug: PlaVIX 75 mg Route: PO;                                                           sv  

18:39 Follow up: Response: Medication administered at discharge.                              sv  

                                                                                                  

                                                                                                  

Point of Care Testing:                                                                            

      Blood Glucose:                                                                              

16:09 Blood Glucose: 111 mg/dL;                                                               hb  

      Ranges:                                                                                     

                                                                                                  

Outcome:                                                                                          

17:32 ER care complete, transfer ordered by MD.                                               rn  

18:39 Transferred by [facility] to [facility], Transfer form completed.      

      X-rays sent w/ patient. Note:  Report give to [place] with  EMS                              

18:39 Condition: stable                                                                           

18:39 Instructed on the need for transfer.                                                        

18:54 Patient left the ED.                                                                      

                                                                                                  

                                                                                                  

NIH Stroke Scale - NIH Stroke Score                                                               

Date: 02/10/2019                                                                                  

Time: 16:18                                                                                       

Total Score = 1                                                                                   

  1a. Level of Consciousness (LOC) - 0(Alert)                                                     

  1b. Level of Consciousness (LOC) (Year \T\ Age) - 0(Both)                                       

  1c. LOC Commands (Open \T\ Closes Eyes/) - 0(Both)                                          

   2. Best Gaze (Lateral Gaze Paresis) - 0(Normal)                                                

   3. Visual Field Loss - 0(No visual loss)                                                       

   4. Facial Palsy - 0(Normal)                                                                    

  5a. Left Arm: Motor (10-second hold) - 0(No drift)                                              

  5b. Right Arm: Motor (10-second hold) - 0(No drift)                                             

  6a. Left Leg: Motor (5-second hold - always test supine) - 0(No drift)                          

  6b. Right Leg: Motor (5-second hold - always test supine) - 0(No drift)                         

   7. Limb Ataxia (finger/nose \T\ heel/shin - test with eyes open) - 0(Absent)                   

   8. Sensory Loss (pinprick arms/legs/face) - 1(Mild to moderate loss)                           

   9. Best Language: Aphasia (description/naming/reading) - 0(No aphasia)                         

  10. Dysarthria (speech clarity - read or repeat words) - 0(Normal)                              

  11. Extinction and Inattention (visual/tactile/auditory/spatial/personal) - 0(No                

      abnormality)                                                                                

Initials:                                                                                       

                                                                                                  

                                                                                                  

NIH Stroke Scale - NIH Stroke Score                                                               

Date: 02/10/2019                                                                                  

Time: 16:30                                                                                       

Total Score = 1                                                                                   

  1a. Level of Consciousness (LOC) - 0(Alert)                                                     

  1b. Level of Consciousness (LOC) (Year \T\ Age) - 0(Both)                                       

  1c. LOC Commands (Open \T\ Closes Eyes/) - 0(Both)                                          

   2. Best Gaze (Lateral Gaze Paresis) - 0(Normal)                                                

   3. Visual Field Loss - 0(No visual loss)                                                       

   4. Facial Palsy - 0(Normal)                                                                    

  5a. Left Arm: Motor (10-second hold) - 0(No drift)                                              

  5b. Right Arm: Motor (10-second hold) - 0(No drift)                                             

  6a. Left Leg: Motor (5-second hold - always test supine) - 0(No drift)                          

  6b. Right Leg: Motor (5-second hold - always test supine) - 0(No drift)                         

   7. Limb Ataxia (finger/nose \T\ heel/shin - test with eyes open) - 0(Absent)                   

   8. Sensory Loss (pinprick arms/legs/face) - 1(Mild to moderate loss)                           

   9. Best Language: Aphasia (description/naming/reading) - 0(No aphasia)                         

  10. Dysarthria (speech clarity - read or repeat words) - 0(Normal)                              

  11. Extinction and Inattention (visual/tactile/auditory/spatial/personal) - 0(No                

      abnormality)                                                                                

Initials: rn                                                                                      

                                                                                                  

                                                                                                  

NIH Stroke Scale - NIH Stroke Score                                                               

Date: 02/10/2019                                                                                  

Time: 16:58                                                                                       

Total Score = 2                                                                                   

  1a. Level of Consciousness (LOC) - 0(Alert)                                                     

  1b. Level of Consciousness (LOC) (Year \T\ Age) - 0(Both)                                       

  1c. LOC Commands (Open \T\ Closes Eyes/) - 0(Both)                                          

   2. Best Gaze (Lateral Gaze Paresis) - 0(Normal)                                                

   3. Visual Field Loss - 0(No visual loss)                                                       

   4. Facial Palsy - 1(Minor Paralysis)                                                           

  5a. Left Arm: Motor (10-second hold) - 0(No drift)                                              

  5b. Right Arm: Motor (10-second hold) - 0(No drift)                                             

  6a. Left Leg: Motor (5-second hold - always test supine) - 0(No drift)                          

  6b. Right Leg: Motor (5-second hold - always test supine) - 0(No drift)                         

   7. Limb Ataxia (finger/nose \T\ heel/shin - test with eyes open) - 0(Absent)                   

   8. Sensory Loss (pinprick arms/legs/face) - 1(Mild to moderate loss)                           

   9. Best Language: Aphasia (description/naming/reading) - 0(No aphasia)                         

  10. Dysarthria (speech clarity - read or repeat words) - 0(Normal)                              

  11. Extinction and Inattention (visual/tactile/auditory/spatial/personal) - 0(No                

      abnormality)                                                                                

Initials: rn                                                                                      

                                                                                                  

Signatures:                                                                                       

Dispatcher [employer]                           Priya Suarez RN RN sv Nieto, Roman, MD MD rn Smirch, Shelby, RN RN ss Baxter, Heather, RN RN hb Garcia, Susana                               sg4                                                  

Pawan Caldwell                                  dl4                                                  

                                                                                                  

Corrections: (The following items were deleted from the chart)                                    

16:14 16:14  / 78; Pulse 99bpm; Resp 16bpm; Pulse Ox 100% RA; Temp 97.2F; Pain  hb          

      0/10; hb                                                                                    

                                                                                                  

**************************************************************************************************

## 2019-02-10 NOTE — RAD REPORT
EXAM DESCRIPTION:  RAD - Chest Single View - 2/10/2019 4:57 pm

 

CLINICAL HISTORY:  Stroke protocol chest film, left-sided symptoms

 

COMPARISON:  CT study May 2018

 

TECHNIQUE:  AP portable chest image was obtained 1654 hour .

 

FINDINGS:  Lungs are clear. Heart and vasculature are normal. No measurable pleural effusion and no p
neumothorax. No acute bony abnormality seen. No acute aortic findings suspected. Right pericardial fa
t pad is present.

 

IMPRESSION:  No acute cardiopulmonary process.

## 2019-03-14 ENCOUNTER — HOSPITAL ENCOUNTER (OUTPATIENT)
Dept: HOSPITAL 97 - FNA | Age: 44
End: 2019-03-14
Attending: NURSE PRACTITIONER
Payer: COMMERCIAL

## 2019-03-14 DIAGNOSIS — E04.1: Primary | ICD-10-CM

## 2019-03-14 PROCEDURE — 88305 TISSUE EXAM BY PATHOLOGIST: CPT

## 2019-03-14 PROCEDURE — 88162 CYTOPATH SMEAR OTHER SOURCE: CPT

## 2019-03-14 NOTE — XMS REPORT
[facility]

 Created on:2019



Patient:Lina Chaparro

Sex:Female

:1975

External Reference #:[id number]





Demographics







 Address  [address] [place] [id number]

 

 Phone  [phone]

 

 Preferred Language  en

 

 Marital Status  Unknown

 

 Holiness Affiliation  Unknown

 

 Race  White

 

 Ethnic Group  Unknown









Author







 Organization  [employer]









Care Team Providers







 Name  Role  Phone

 

 Monique Brown  Provider Role  Unavailable









Allergies

No Known Allergies



Problems







 Problem Type  Condition  Code  Onset Dates  Condition Status

 

 Problem  Thyroid lesion  E07.89    Active

 

 Problem  Anxiety  F41.9    Active

 

 Problem  Thyroid mass  E07.9    Active

 

 Assessment  Thyroid mass  E07.9    Active







Medications

No Known Medications



Results

No Known Results



Summary Purpose

[employer] Submission

## 2019-03-14 NOTE — XMS REPORT
Patient Summary Document

 Created on:2019



Patient:PONCHO PUGH

Sex:Female

:1975

External Reference #:[id number]





Demographics







 Address  [address]



   [place] [address]

 

 Home Phone  [phone]

 

 Email Address  NONE

 

 Preferred Language  Unknown

 

 Marital Status  Unknown

 

 Nondenominational Affiliation  Unknown

 

 Race  Unknown

 

 Additional Race(s)  Unavailable

 

 Ethnic Group  Unknown









Author







 Organization  [facility]

 

 Address  [address]



   [place] [address]

 

 Phone  [phone]









Care Team Providers







 Name  Role  Phone

 

 TRU LEBLANC  Unavailable  Unavailable









Problems

This patient has no known problems.



Allergies, Adverse Reactions, Alerts

This patient has no known allergies or adverse reactions.



Medications

This patient has no known medications.



Results







 Test Description  Test Time  Test Comments  Text Results  Atomic Results  
Result Comments









 CT, CTANGIO  BRAIN  2019 16:32:00    FINAL REPORT PATIENT ID:   [id number] 
CTA  



       carotids and brain 2019 4:29 PM  



       CLINICAL INDICATION: TIA COMPARISON: None  



       available TECHNIQUE: Noncontrast axial CT  



       images of the head were obtained.  



       Subsequently, axial CT angiographic images  



       of the upper chest, neck, and head were  



       obtained, from which three-dimensional  



       reconstructed images were created.  



       Additional imaging series were created on  



       an independent workstation using maximum  



       intensity projection and volume rendered  



       technique. This examination was performed  



       according to our departmental dose  



       optimization program, which includes  



       automated exposure control, adjustment of  



       the mA and/or kV according to patient  



       size, and/or use of iterated  



       reconstruction technique. FINDINGS: There  



       is no intracranial hemorrhage, mass,  



       hydrocephalus, extra-axial collection, or  



       midline shift. There is no CT evidence for  



       cerebral infarction. Patient motion and  



       poor contrast bolus timing limit this  



       examination. Pathology may be obscured.  



       With these limitations in mind, there is  



       no vessel occlusion in the intracranial or  



       extracranial arterial vasculature. There  



       is no NASCET quantifiable cervical  



       internal carotid artery stenosis. There is  



       no remarkable stenosis elsewhere in the  



       intracranial or extracranial arterial  



       vasculature. There is a 27 mm mixed  



       cystic/solid lesion in the dorsal left  



       lobe of the thyroid gland. The remaining  



       visualized soft tissue and skeleton are  



       without worrisome finding. IMPRESSION: 1.  



       Limited, but otherwise unremarkable  



       intracranial and extracranial CTAs. 2.  



       Indeterminate left thyroid lesion, for  



       which further evaluation with ultrasound  



       is recommended Signed: Seipel, Timothy MDReport Verified Date/Time:  2019  



       16:32:54 Reading Location: [facility]  



       Radiology Reading Room    Electronically  



       signed by: TIMOTHY J SEIPEL, M.D. on  



       2019 04:32 PM  

 

 CT, CAROTID, ANGIO  2019 16:32:00    FINAL REPORT PATIENT ID:   [id number] 
CTA  



       carotids and brain 2019 4:29 PM  



       CLINICAL INDICATION: TIA COMPARISON: None  



       available TECHNIQUE: Noncontrast axial CT  



       images of the head were obtained.  



       Subsequently, axial CT angiographic images  



       of the upper chest, neck, and head were  



       obtained, from which three-dimensional  



       reconstructed images were created.  



       Additional imaging series were created on  



       an independent workstation using maximum  



       intensity projection and volume rendered  



       technique. This examination was performed  



       according to our departmental dose  



       optimization program, which includes  



       automated exposure control, adjustment of  



       the mA and/or kV according to patient  



       size, and/or use of iterated  



       reconstruction technique. FINDINGS: There  



       is no intracranial hemorrhage, mass,  



       hydrocephalus, extra-axial collection, or  



       midline shift. There is no CT evidence for  



       cerebral infarction. Patient motion and  



       poor contrast bolus timing limit this  



       examination. Pathology may be obscured.  



       With these limitations in mind, there is  



       no vessel occlusion in the intracranial or  



       extracranial arterial vasculature. There  



       is no NASCET quantifiable cervical  



       internal carotid artery stenosis. There is  



       no remarkable stenosis elsewhere in the  



       intracranial or extracranial arterial  



       vasculature. There is a 27 mm mixed  



       cystic/solid lesion in the dorsal left  



       lobe of the thyroid gland. The remaining  



       visualized soft tissue and skeleton are  



       without worrisome finding. IMPRESSION: 1.  



       Limited, but otherwise unremarkable  



       intracranial and extracranial CTAs. 2.  



       Indeterminate left thyroid lesion, for  



       which further evaluation with ultrasound  



       is recommended Signed: Seipel, Timothy MDReport Verified Date/Time:  2019  



       16:32:54 Reading Location: [facility]  



       Radiology Reading Room    Electronically  



       signed by: TIMOTHY J SEIPEL, M.D. on  



       2019 04:32 PM  









 HEMOGLOBIN A1C  2019 11:14:00    









   Test Item  Value  Reference Range  Comments









 HEMOGLOBIN A1C (BEAKER) (test code=368)  4.8 %  4.3-6.1  



VITAMIN B12 AND [id number] 02:22:00





 Test Item  Value  Reference Range  Comments

 

 VITAMIN B12 (BEAKER) (test code=774)  327 pg/mL  213-816  

 

 FOLATE (BEAKER) (test code=362)  11.9 ng/mL  >=7.0  



TSH/FREE T4 IF [id number] 00:10:00





 Test Item  Value  Reference Range  Comments

 

 THYROID STIMULATING HORMONE (BEAKER) (test  1.73 uIU/mL  0.35-4.94  



 code=772)      



TROPONIN -02-10 23:55:00





 Test Item  Value  Reference Range  Comments

 

 TROPONIN I (BEAKER) (test code=397)  < ng/mL  0.00-0.03  








 Test Item  Value  Reference Range  Comments

 

 MAGNESIUM (BEAKER) (test code=627)  2.0 mg/dL  1.6-2.6  



BASIC METABOLIC PANEL2019-02-10 23:49:00





 Test Item  Value  Reference Range  Comments

 

 SODIUM (BEAKER) (test  139 meq/L  136-145  



 ogdf=658)      

 

 POTASSIUM (BEAKER) (test  3.3 meq/L  3.5-5.1  



 code=379)      

 

 CHLORIDE (BEAKER) (test  106 meq/L    



 code=382)      

 

 CO2 (BEAKER) (test  24 meq/L  22-29  



 code=355)      

 

 BLOOD UREA NITROGEN  7 mg/dL  7-21  



 (BEAKER) (test code=354)      

 

 CREATININE (BEAKER) (test  0.71 mg/dL  0.57-1.25  



 code=358)      

 

 GLUCOSE RANDOM (BEAKER)  125 mg/dL    



 (test code=652)      

 

 CALCIUM (BEAKER) (test  9.2 mg/dL  8.4-10.2  



 code=697)      

 

 EGFR (BEAKER) (test  90 mL/min/1.73 sq m    ESTIMATED GFR IS NOT AS



 code=1092)      ACCURATE AS CREATININE



       CLEARANCE IN PREDICTING



       GLOMERULAR FILTRATION



       RATE. ESTIMATED GFR IS



       NOT APPLICABLE FOR



       DIALYSIS PATIENTS.



LIPID PANEL2019-02-10 23:49:00





 Test Item  Value  Reference Range  Comments

 

 TRIGLYCERIDES (BEAKER) (test code=540)  93 mg/dL    

 

 CHOLESTEROL (BEAKER) (test code=631)  214 mg/dL    

 

 HDL CHOLESTEROL (BEAKER) (test code=976)  55 mg/dL    

 

 LDL CHOLESTEROL CALCULATED (BEAKER) (test  140 mg/dL    



 code=633)      



Triglyceride Reference Range:   Low Risk         &lt;150   Borderline    150-
199   High Risk     200-499   Very High Risk  &gt;=500Cholesterol Reference 
Range:   Low Risk         &lt;200   Borderline 200-239    High Risk        &gt;
240HDL Cholesterol Reference Range:   Low Risk         &gt;=60   High Risk     
    &lt;40LDL Cholesterol Reference Range:   Optimal          &lt;100   Near 
Optimal  100-129   Borderline    130-159   High          160-189   Very High   
    &gt;=190HEPATIC FUNCTION PANEL2019-02-10 23:49:00





 Test Item  Value  Reference Range  Comments

 

 TOTAL PROTEIN (BEAKER) (test code=770)  6.8 gm/dL  6.0-8.3  

 

 ALBUMIN (BEAKER) (test code=1145)  3.8 g/dL  3.5-5.0  

 

 BILIRUBIN TOTAL (BEAKER) (test code=377)  0.7 mg/dL  0.2-1.2  

 

 BILIRUBIN DIRECT (BEAKER) (test code=706)  0.2 mg/dL  0.1-0.5  

 

 ALKALINE PHOSPHATASE (BEAKER) (test code=346)  63 U/L    

 

 AST (SGOT) (BEAKER) (test code=353)  14 U/L  5-34  

 

 ALT (SGPT) (BEAKER) (test code=347)  12 U/L  6-55  



PREGNANCY SCREEN, URINE2019-02-10 23:37:00





 Test Item  Value  Reference Range  Comments

 

 PREGNANCY TEST URINE (BEAKER) (test code=583)  Negative    



PT/APTT2019-02-10 23:36:00





 Test Item  Value  Reference Range  Comments

 

 PROTIME (BEAKER) (test code=759)  13.9 seconds  11.7-14.7  

 

 INR (BEAKER) (test code=370)  1.1  <=5.9  

 

 PARTIAL THROMBOPLASTIN TIME (BEAKER) (test  29.0 seconds  22.5-36.0  



 code=760)      



RECOMMENDED COUMADIN/WARFARIN INR THERAPY RANGESSTANDARD DOSE: 2.0 - 3.0   
Includes: PROPHYLAXIS forvenous thrombosis, systemic embolization; TREATMENT 
for venous thrombosis and/or pulmonary embolus.HIGH RISK: Target INR is 2.5-3.5 
for patients with mechanical heart valves.CBC W/PLT COUNT &amp; AUTO 
DIFFERENTIAL2019-02-10 23:27:00





 Test Item  Value  Reference Range  Comments

 

 WHITE BLOOD CELL COUNT (BEAKER) (test code=775)  7.7 K/ L  3.5-10.5  

 

 RED BLOOD CELL COUNT (BEAKER) (test code=761)  3.99 M/ L  3.93-5.22  

 

 HEMOGLOBIN (BEAKER) (test code=410)  12.3 GM/DL  11.2-15.7  

 

 HEMATOCRIT (BEAKER) (test code=411)  36.7 %  34.1-44.9  

 

 MEAN CORPUSCULAR VOLUME (BEAKER) (test code=753)  92.0 fL  79.4-94.8  

 

 MEAN CORPUSCULAR HEMOGLOBIN (BEAKER) (test  30.8 pg  25.6-32.2  



 clnu=801)      

 

 MEAN CORPUSCULAR HEMOGLOBIN CONC (BEAKER) (test  33.5 GM/DL  32.2-35.5  



 code=752)      

 

 RED CELL DISTRIBUTION WIDTH (BEAKER) (test  13.9 %  11.7-14.4  



 code=412)      

 

 PLATELET COUNT (BEAKER) (test code=756)  304 K/CU MM  150-450  

 

 MEAN PLATELET VOLUME (BEAKER) (test code=754)  9.3 fL  9.4-12.3  

 

 NUCLEATED RED BLOOD CELLS (BEAKER) (test  0 /100 WBC  0-0  



 code=413)      

 

 NEUTROPHILS RELATIVE PERCENT (BEAKER) (test  64 %    



 code=429)      

 

 LYMPHOCYTES RELATIVE PERCENT (BEAKER) (test  29 %    



 code=430)      

 

 MONOCYTES RELATIVE PERCENT (BEAKER) (test  5 %    



 code=431)      

 

 EOSINOPHILS RELATIVE PERCENT (BEAKER) (test  1 %    



 code=432)      

 

 BASOPHILS RELATIVE PERCENT (BEAKER) (test  1 %    



 code=437)      

 

 NEUTROPHILS ABSOLUTE COUNT (BEAKER) (test  4.89 K/ L  1.56-6.13  



 code=670)      

 

 LYMPHOCYTES ABSOLUTE COUNT (BEAKER) (test  2.25 K/ L  1.18-3.74  



 code=414)      

 

 MONOCYTES ABSOLUTE COUNT (BEAKER) (test  0.40 K/ L  0.24-0.36  



 code=415)      

 

 EOSINOPHILS ABSOLUTE COUNT (BEAKER) (test  0.07 K/ L  0.04-0.36  



 code=416)      

 

 BASOPHILS ABSOLUTE COUNT (BEAKER) (test  0.04 K/ L  0.01-0.08  



 code=417)      

 

 IMMATURE GRANULOCYTES-RELATIVE PERCENT (BEAKER)  1 %  0-1  



 (test code=2801)

## 2019-03-14 NOTE — RAD REPORT
EXAM DESCRIPTION:  US - Guided FNA Non Breast - 3/14/2019 10:26 am

 

CLINICAL HISTORY:   Thyroid nodule ICD E07.9

 

COMPARISON:  March 1, 2019 ultrasound

 

TECHNIQUE:  Risks, benefits and alternatives of procedure explained to the patient and informed conse
nt obtained.

 

Skin and subcutaneous tissues anesthetized with lidocaine.

 

Under sonographic guidance, five 25 gauge needle passes were obtained into the dominant nodule within
 the left lobe of the thyroid gland.

 

Specimens given to pathology.

 

Patient experienced no immediate complication

 

IMPRESSION:  Fine-needle aspiration of a dominant nodule within left lobe of thyroid gland

## 2019-03-14 NOTE — XMS REPORT
Clinical Summary

 Created on:2019



Patient:Lina Chaparro

Sex:Female

:1975

External Reference #:[id number]





Demographics







 Address  [address]



   [place] [address]

 

 Home Phone  [phone]

 

 Preferred Language  English

 

 Marital Status  Unknown

 

 Hinduism Affiliation  Unknown

 

 Race  White

 

 Ethnic Group  Not  or 









Author







 Organization  [facility]

 

 Address  [address]



   [place] [address]









Support







 Name  Relationship  Address  Phone

 

 REYNOLD PASCUAL  Unavailable  Unavailable  [phone]

 

 DAVIS SANTANA  Unavailable  Unavailable  [phone]









Care Team Providers







 Name  Role  Phone

 

 Unavailable  Primary Care Provider  Unavailable









Allergies







 Active Allergy  Reactions  Severity  Noted Date  Comments

 

 Sulfamethoxazole-Trimethoprim  Rash  Medium  02/10/2019  







Medications







 Medication  Sig  Dispensed  Refills  Start Date  End Date  Status

 

 ALPRAZolam (XANAX) 0.5  Take 0.5 mg by    0      Active



 MG tablet  mouth as needed          



   for Anxiety.          

 

 aspirin 81 MG chewable  Take 1 tablet  30 tablet  0  2019  
Active



 tablet  (81 mg total) by          



   mouth daily.          

 

 atorvastatin (LIPITOR)  Take 1 tablet  30 tablet  0  2019  
Active



 20 MG tablet  (20 mg total) by          



   mouth nightly.          







Active Problems







 Problem  Noted Date

 

 Numbness and tingling of left arm and leg  02/10/2019

 

 Left facial numbness  02/10/2019







Encounters







 Date  Type  Specialty  Care Team  Description

 

 02/10/2019 -  Hospital Encounter  Cardiology  Megan Campos  Left facial 
numbness;



 2019      MD Arias



  Numbness and tingling of left arm and leg;



       Jessica Lousi  Smoker;



       Lillian Mohan,  Stress at home



       Eloisa Raya MD  



after 2018



Family History







 Medical History  Relation  Name  Comments

 

 Stroke  Maternal Grandmother    









 Relation  Name  Status  Comments

 

 Maternal Grandmother      







Social History







 Tobacco Use  Types  Packs/Day  Years Used  Date

 

 Current Every Day Smoker    1  20  









 Smokeless Tobacco: Never Used      









 Tobacco Cessation: Counseling Given: Yes









 Alcohol Use  Drinks/Week  oz/Week  Comments

 

 Yes      Social, moderate









 Alcohol Habits  Answer  Date Recorded

 

 How often do you have a drink containing alcohol?  Never  02/10/2019

 

 How many drinks containing alcohol do you have on a typical  Not asked  



 day when you are drinking?    

 

 How often do you have six or more drinks on one occasion?  Not asked  









 Sex Assigned at Birth  Date Recorded

 

 Not on file  









 Job Start Date  Occupation  Industry

 

 Not on file  Not on file  Not on file









 Travel History  Travel Start  Travel End









 No recent travel history available.







Last Filed Vital Signs







 Vital Sign  Reading  Time Taken

 

 Blood Pressure  106/58  2019  3:00 PM CST

 

 Pulse  76  2019  3:00 PM CST

 

 Temperature  36.2 C (97.2 F)  2019  3:00 PM CST

 

 Respiratory Rate  18  2019  3:00 PM CST

 

 Oxygen Saturation  99%  2019  3:00 PM CST

 

 Inhaled Oxygen Concentration  -  -

 

 Weight  -  -

 

 Height  -  -

 

 Body Mass Index  -  -







Plan of Treatment

Not on file



Procedures







 Procedure Name  Priority  Date/Time  Associated  Comments



       Diagnosis  

 

 CT/CTA CAROTID  Routine  2019  4:21    Results for this



     PM CST    procedure are in



         the results



         section.

 

 CT/CTA BRAIN  Routine  2019  4:21    Results for this



     PM CST    procedure are in



         the results



         section.

 

 PREGNANCY SCREEN,  Routine  02/10/2019 11:15    Results for this



 URINE    PM CST    procedure are in



         the results



         section.

 

 CBC W/PLT COUNT &  Routine  02/10/2019 11:14    Results for this



 AUTO DIFFERENTIAL    PM CST    procedure are in



         the results



         section.

 

 TROPONIN I  Routine  02/10/2019 11:14    Results for this



     PM CST    procedure are in



         the results



         section.

 

 VITAMIN B12 AND  Routine  02/10/2019 11:14    Results for this



 FOLATE    PM CST    procedure are in



         the results



         section.

 

 TSH/FREE T4 IF  Routine  02/10/2019 11:14    Results for this



 INDICATED    PM CST    procedure are in



         the results



         section.

 

 CBC W/PLT COUNT &  Routine  02/10/2019 11:14    Results for this



 AUTO DIFFERENTIAL    PM CST    procedure are in



         the results



         section.

 

 MAGNESIUM  Routine  02/10/2019 11:14    Results for this



     PM CST    procedure are in



         the results



         section.

 

 LIPID PANEL  Routine  02/10/2019 11:14    Results for this



     PM CST    procedure are in



         the results



         section.

 

 PT/APTT  Routine  02/10/2019 11:14    Results for this



     PM CST    procedure are in



         the results



         section.

 

 HEMOGLOBIN A1C  Routine  02/10/2019 11:14    Results for this



     PM CST    procedure are in



         the results



         section.

 

 HEPATIC FUNCTION  Routine  02/10/2019 11:14    Results for this



 PANEL    PM CST    procedure are in



         the results



         section.

 

 BASIC METABOLIC PANEL  Routine  02/10/2019 11:14    Results for this



 (7)    PM CST    procedure are in



         the results



         section.

 

 ECG 12-LEAD  Routine  02/10/2019 10:19    Results for this



     PM CST    procedure are in



         the results



         section.



after 2018



Results

CTA carotid (2019  4:21 PM CST)





 Narrative  Performed At

 

 FINAL REPORT



  [facility]



  



  



 PATIENT ID: [id number]



  



  



  



 CTA carotids and brain 2019 4:29 PM



  



  



  



 CLINICAL INDICATION: TIA



  



  



  



 COMPARISON: None available



  



  



  



 TECHNIQUE: Noncontrast axial CT images of the head were obtained. 



  



 Subsequently, axial CT angiographic images of the upper chest, neck, 



  



 and head were obtained, from which three-dimensional reconstructed 



  



 images were created. Additional imaging series were created on an 



  



 independent workstation using maximum intensity projection and volume 



  



 rendered technique. This examination was performed according to our 



  



 departmental dose optimization program, which includes automated 



  



 exposure control, adjustment of the mA and/or kV according to patient 



  



 size, and/or use of iterated reconstruction technique.



  



  



  



 FINDINGS:



  



  



  



 There is no intracranial hemorrhage, mass, hydrocephalus, extra-axial 



  



 collection, or midline shift. There is no CT evidence for cerebral 



  



 infarction.



  



  



  



 Patient motion and poor contrast bolus timing limit this examination. 



  



 Pathology may be obscured.



  



  



  



 With these limitations in mind, there is no vessel occlusion in the 



  



 intracranial or extracranial arterial vasculature. There is no NASCET 



  



 quantifiable cervical internal carotid artery stenosis. There is no 



  



 remarkable stenosis elsewhere in the intracranial or extracranial 



  



 arterial vasculature.



  



  



  



 There is a 27 mm mixed cystic/solid lesion in the dorsal left lobe of 



  



 the thyroid gland. The remaining visualized soft tissue and skeleton 



  



 are without worrisome finding.



  



  



  



 IMPRESSION:



  



  



  



 1. Limited, but otherwise unremarkable intracranial and extracranial 



  



 CTAs.



  



  



  



 2. Indeterminate left thyroid lesion, for which further evaluation 



  



 with ultrasound is recommended



  



  



  



 Signed: Seipel, Timothy MD



  



 Report Verified Date/Time:2019 16:32:54



  



  



  



 Reading Location: [facility] Radiology Reading Room



  



 Electronically signed by: TIMOTHY J SEIPEL, M.D. on 2019  



 04:32 PM



  



   









 Procedure Note

 

 Interface, External Ris In - 2019  4:35 PM CST



FINAL REPORT



 



 PATIENT ID:   [id number]



 



 CTA carotids and brain 2019 4:29 PM



 



 CLINICAL INDICATION: TIA



 



 COMPARISON: None available



 



 TECHNIQUE: Noncontrast axial CT images of the head were obtained.



 Subsequently, axial CT angiographic images of the upper chest, neck,



 and head were obtained, from which three-dimensional reconstructed



 images were created. Additional imaging series were created on an



 independent workstation using maximum intensity projection and volume



 rendered technique. This examination was performed according to our



 departmental dose optimization program, which includes automated



 exposure control, adjustment of the mA and/or kV according to patient



 size, and/or use of iterated reconstruction technique.



 



 FINDINGS:



 



 There is no intracranial hemorrhage, mass, hydrocephalus, extra-axial



 collection, or midline shift. There is no CT evidence for cerebral



 infarction.



 



 Patient motion and poor contrast bolus timing limit this examination.



 Pathology may be obscured.



 



 With these limitations in mind, there is no vessel occlusion in the



 intracranial or extracranial arterial vasculature. There is no NASCET



 quantifiable cervical internal carotid artery stenosis. There is no



 remarkable stenosis elsewhere in the intracranial or extracranial



 arterial vasculature.



 



 There is a 27 mm mixed cystic/solid lesion in the dorsal left lobe of



 the thyroid gland. The remaining visualized soft tissue and skeleton



 are without worrisome finding.



 



 IMPRESSION:



 



 1. Limited, but otherwise unremarkable intracranial and extracranial



 CTAs.



 



 2. Indeterminate left thyroid lesion, for which further evaluation



 with ultrasound is recommended



 



 Signed: Seipel, Timothy MD



 Report Verified Date/Time:  2019 16:32:54



 



 Reading Location: [facility] Radiology Reading Room



     Electronically signed by: TIMOTHY J SEIPEL, M.D. on 2019 04:32 PM



 









 Performing Organization  Address  City/State/Zipcode  Phone Number

 

 [facility]      



CTA brain (2019  4:21 PM CST)





 Narrative  Performed At

 

 FINAL REPORT



  [facility]



  



  



 PATIENT ID: [id number]



  



  



  



 CTA carotids and brain 2019 4:29 PM



  



  



  



 CLINICAL INDICATION: TIA



  



  



  



 COMPARISON: None available



  



  



  



 TECHNIQUE: Noncontrast axial CT images of the head were obtained. 



  



 Subsequently, axial CT angiographic images of the upper chest, neck, 



  



 and head were obtained, from which three-dimensional reconstructed 



  



 images were created. Additional imaging series were created on an 



  



 independent workstation using maximum intensity projection and volume 



  



 rendered technique. This examination was performed according to our 



  



 departmental dose optimization program, which includes automated 



  



 exposure control, adjustment of the mA and/or kV according to patient 



  



 size, and/or use of iterated reconstruction technique.



  



  



  



 FINDINGS:



  



  



  



 There is no intracranial hemorrhage, mass, hydrocephalus, extra-axial 



  



 collection, or midline shift. There is no CT evidence for cerebral 



  



 infarction.



  



  



  



 Patient motion and poor contrast bolus timing limit this examination. 



  



 Pathology may be obscured.



  



  



  



 With these limitations in mind, there is no vessel occlusion in the 



  



 intracranial or extracranial arterial vasculature. There is no NASCET 



  



 quantifiable cervical internal carotid artery stenosis. There is no 



  



 remarkable stenosis elsewhere in the intracranial or extracranial 



  



 arterial vasculature.



  



  



  



 There is a 27 mm mixed cystic/solid lesion in the dorsal left lobe of 



  



 the thyroid gland. The remaining visualized soft tissue and skeleton 



  



 are without worrisome finding.



  



  



  



 IMPRESSION:



  



  



  



 1. Limited, but otherwise unremarkable intracranial and extracranial 



  



 CTAs.



  



  



  



 2. Indeterminate left thyroid lesion, for which further evaluation 



  



 with ultrasound is recommended



  



  



  



 Signed: Seipel, Timothy MD



  



 Report Verified Date/Time:2019 16:32:54



  



  



  



 Reading Location: [facility] Radiology Reading Room



  



 Electronically signed by: TIMOTHY J SEIPEL, M.D. on 2019  



 04:32 PM



  



   









 Procedure Note

 

 Interface, External Ris In - 2019  4:35 PM CST



FINAL REPORT



 



 PATIENT ID:   [id number]



 



 CTA carotids and brain 2019 4:29 PM



 



 CLINICAL INDICATION: TIA



 



 COMPARISON: None available



 



 TECHNIQUE: Noncontrast axial CT images of the head were obtained.



 Subsequently, axial CT angiographic images of the upper chest, neck,



 and head were obtained, from which three-dimensional reconstructed



 images were created. Additional imaging series were created on an



 independent workstation using maximum intensity projection and volume



 rendered technique. This examination was performed according to our



 departmental dose optimization program, which includes automated



 exposure control, adjustment of the mA and/or kV according to patient



 size, and/or use of iterated reconstruction technique.



 



 FINDINGS:



 



 There is no intracranial hemorrhage, mass, hydrocephalus, extra-axial



 collection, or midline shift. There is no CT evidence for cerebral



 infarction.



 



 Patient motion and poor contrast bolus timing limit this examination.



 Pathology may be obscured.



 



 With these limitations in mind, there is no vessel occlusion in the



 intracranial or extracranial arterial vasculature. There is no NASCET



 quantifiable cervical internal carotid artery stenosis. There is no



 remarkable stenosis elsewhere in the intracranial or extracranial



 arterial vasculature.



 



 There is a 27 mm mixed cystic/solid lesion in the dorsal left lobe of



 the thyroid gland. The remaining visualized soft tissue and skeleton



 are without worrisome finding.



 



 IMPRESSION:



 



 1. Limited, but otherwise unremarkable intracranial and extracranial



 CTAs.



 



 2. Indeterminate left thyroid lesion, for which further evaluation



 with ultrasound is recommended



 



 Signed: Seipel, Timothy MD



 Report Verified Date/Time:  2019 16:32:54



 



 Reading Location: [facility] Radiology Reading Room



     Electronically signed by: TIMOTHY J SEIPEL, M.D. on 2019 04:32 PM



 









 Performing Organization  Address  City/[facility]  Phone Number

 

 [facility]      



Pregnancy Screen, urine (02/10/2019 11:15 PM CST)





 Component  Value  Ref Range  Performed At

 

 Preg Test, Ur  Negative    [facility]









 Specimen

 

 Urine









 Performing Organization  Address  City/[facility]  Phone Number

 

 [address] [address] [phone]
[phone]



 CENTER      



Vitamin B12 and Folate (02/10/2019 11:14 PM CST)





 Component  Value  Ref Range  Performed At

 

 Vitamin B12  327  213 - 816 pg/mL  [facility]

 

 Folate  11.9  >=7.0 ng/mL  [facility]









 Specimen

 

 Blood









 Performing Organization  Address  City/[facility]ode  Phone Number

 

 [address] [address] [phone]
[phone]



 CENTER      



TSH/Free T4 If Indicated (02/10/2019 11:14 PM CST)





 Component  Value  Ref Range  Performed At

 

 TSH  1.73  0.35 - 4.94 uIU/mL  [facility]









 Specimen

 

 Blood









 Performing Organization  Address  City/[facility]  Phone Number

 

 [address] [address] [phone]
[phone]



 CENTER      



PT/aPTT (02/10/2019 11:14 PM CST)





 Component  Value  Ref Range  Performed At

 

 Protime  13.9  11.7 - 14.7 seconds  [facility]

 

 INR  1.1  <=5.9  [facility]

 

 PTT  29.0  22.5 - 36.0 seconds  [facility]









 Specimen

 

 Blood









 Narrative  Performed At

 

 RECOMMENDED COUMADIN/WARFARIN INR THERAPY  [facility]



 RANGES



  



 STANDARD DOSE: 2.0 - 3.0 Includes:  



 PROPHYLAXIS for venous thrombosis, systemic  



 embolization; TREATMENT for venous thrombosis  



 and/or pulmonary embolus.



  



 HIGH RISK: Target INR is 2.5-3.5 for patients  



 with mechanical heart valves.  









 Performing Organization  Address  City/State/Zipcode  Phone Number

 

 [facility]  [address] [place] [address] [phone]
[phone]



 CENTER      



CBC with platelet count + automated diff (02/10/2019 11:14 PM CST)





 Component  Value  Ref Range  Performed At

 

 WBC  7.7  3.5 - 10.5 K/L  [facility]

 

 RBC  3.99  3.93 - 5.22 M/L  [facility]

 

 Hemoglobin  12.3  11.2 - 15.7 GM/DL  [facility]

 

 Hematocrit  36.7  34.1 - 44.9 %  [facility]

 

 MCV  92.0  79.4 - 94.8 fL  [facility]

 

 MCH  30.8  25.6 - 32.2 pg  [facility]

 

 MCHC  33.5  32.2 - 35.5 GM/DL  [facility]

 

 RDW  13.9  11.7 - 14.4 %  [facility]

 

 Platelets  304  150 - 450 K/CU MM  [facility]

 

 MPV  9.3 (L)  9.4 - 12.3 fL  [facility]

 

 nRBC  0  0 - 0 /100 WBC  [facility]

 

 % Neutros  64  %  [facility]

 

 % Lymphs  29  %  [facility]

 

 % Monos  5  %  [facility]

 

 % Eos  1  %  [facility]

 

 % Baso  1  %  [facility]

 

 # Neutros  4.89  1.56 - 6.13 K/L  [facility]

 

 # Lymphs  2.25  1.18 - 3.74 K/L  [facility]

 

 # Monos  0.40 (H)  0.24 - 0.36 K/L  [facility]

 

 # Eos  0.07  0.04 - 0.36 K/L  [facility]

 

 # Baso  0.04  0.01 - 0.08 K/L  [facility]

 

 Immature Granulocytes-Relative  1  0 - 1 %  [facility]









 Specimen

 

 Blood









 Performing Organization  Address  City/[facility]  Phone Number

 

 [address] [address] [phone]
[phone]



 [place]      



Troponin I (02/10/2019 11:14 PM CST)





 Component  Value  Ref Range  Performed At

 

 Troponin I  <0.01  0.00 - 0.03 ng/mL  [facility]









 Specimen

 

 Blood









 Narrative  Performed At

 

 Troponin I (TnI) levels must be interpreted  [facility]



 in the context of the presenting symptoms and  



 the clinical findings. Elevated TnI levels  



 indicate myocardial damage, but are not  



 specific for ischemic heart disease. Elevated  



 TnI levels are seen in patients with other  



 cardiac conditions (including myocarditis and  



 congestive heart failure), and slight TnI  



 elevations occur in patients with other  



 conditions, including sepsis, renal failure,  



 acidosis, acute neurological disease, and  



 persistent tachyarrhythmia.  









 Performing Organization  Address  City/[facility]ode  Phone Number

 

 [address] [address] [phone]
[phone]



 CENTER      



Magnesium (02/10/2019 11:14 PM CST)





 Component  Value  Ref Range  Performed At

 

 Magnesium  2.0  1.6 - 2.6 mg/dL  [facility]









 Specimen

 

 Blood









 Performing Organization  Address  City/[facility]  Phone Number

 

 [address] [address] [phone]
[phone]



 CENTER      



Hemoglobin A1c (02/10/2019 11:14 PM CST)





 Component  Value  Ref Range  Performed At

 

 Hemoglobin A1C  4.8  4.3 - 6.1 %  [facility]









 Specimen

 

 Blood









 Performing Organization  Address  City/[facility]  Phone Number

 

 [address] [address] [phone]
[phone]



 [place]      



Hepatic function panel (02/10/2019 11:14 PM CST)





 Component  Value  Ref Range  Performed At

 

 Protein, Total  6.8  6.0 - 8.3 gm/dL  [facility]

 

 Albumin  3.8  3.5 - 5.0 g/dL  [facility]

 

 Total Bilirubin  0.7  0.2 - 1.2 mg/dL  [facility]

 

 Bilirubin, Direct  0.2  0.1 - 0.5 mg/dL  [facility]

 

 Alkaline Phosphatase  63  40 - 150 U/L  [facility]

 

 AST  14  5 - 34 U/L  [facility]

 

 ALT  12  6 - 55 U/L  [facility]









 Specimen

 

 Blood









 Performing Organization  Address  City/[facility]  Phone Number

 

 [address] [address] [phone]
[phone]



 [place]      



Lipid panel (02/10/2019 11:14 PM CST)





 Component  Value  Ref Range  Performed At

 

 Triglycerides  93  mg/dL  [facility]

 

 Cholesterol  214  mg/dL  [facility]

 

 HDL  55  mg/dL  [facility]

 

 LDL Calculated  140  mg/dL  [facility]









 Specimen

 

 Blood









 Narrative  Performed At

 

 Triglyceride Reference Range:



  [facility]



  Low Risk <150



  



  [id number]



  



  High Risk 200-499



  



  Very High Risk>=500



  



  



  



 Cholesterol Reference Range:



  



  Low Risk <200



  



  [id number] 



  



  High Risk>240



  



  



  



 HDL Cholesterol Reference Range:



  



  Low Risk >=60



  



  High Risk <40



  



  



  



 LDL Cholesterol Reference Range:



  



  Optimal<100



  



  Near [id number]



  



  [id number]



  



  [id number]



  



  Very High >=190  









 Performing Organization  Address  City/[facility]ercode  Phone Number

 

 [address] [address] [phone]
[phone]



 [place]      



Basic metabolic panel (02/10/2019 11:14 PM CST)





 Component  Value  Ref Range  Performed At

 

 Sodium  139  136 - 145 meq/L  [facility]

 

 Potassium  3.3 (L)  3.5 - 5.1 meq/L  [facility]

 

 Chloride  106  98 - 107 meq/L  [facility]

 

 CO2  24  22 - 29 meq/L  [facility]

 

 BUN  7  7 - 21 mg/dL  [facility]

 

 Creatinine  0.71  0.57 - 1.25 mg/dL  [facility]

 

 Glucose  125 (H)  70 - 105 mg/dL  [facility]

 

 Calcium  9.2  8.4 - 10.2 mg/dL  [facility]

 

 EGFR  90Comment: ESTIMATED GFR IS  mL/min/1.73 sq m  [facility]



   NOT AS ACCURATE AS CREATININE    [facility]



   CLEARANCE IN PREDICTING    



   GLOMERULAR FILTRATION RATE.    



   ESTIMATED GFR IS NOT    



   APPLICABLE FOR DIALYSIS    



   PATIENTS.    









 Specimen

 

 Blood









 Performing Organization  Address  City/State/Zipcode  Phone Number

 

 [facility]  [address] [place] [address] [phone]
[phone]



 CENTER      



ECG 12 lead (02/10/2019 10:19 PM CST)





 Narrative  Performed At

 

 Ventricular Rate 65 BPM



  [facility]



 Atrial Rate 65 BPM



  



 P-R Interval 144 ms



  



 QRS Duration 92 ms



  



 Q-T Interval 408 ms



  



 QTC Calculation(Bazett) 424 ms



  



 P Axis 38 degrees



  



 R Axis 23 degrees



  



 T Axis -6 degrees



  



  



  



 Normal sinus rhythm



  



 Normal ECG



  



 When compared with ECG of 1996 12:05,



  



 Vent. rate has decreased BY37 BPM



  



 Questionable change in QRS duration



  



 Confirmed by Karel Timmons ([id number]) on 2019 6:50:05 PM  









 Procedure Note

 

 Interface, External Ris In - 2019  6:50 PM CST



Ventricular Rate 65 BPM



 Atrial Rate 65 BPM



 P-R Interval 144 ms



 QRS Duration 92 ms



 Q-T Interval 408 ms



 QTC Calculation(Bazett) 424 ms



 P Axis 38 degrees



 R Axis 23 degrees



 T Axis -6 degrees



 



 Normal sinus rhythm



 Normal ECG



 When compared with ECG of 1996 12:05,



 Vent. rate has decreased BY  37 BPM



 Questionable change in QRS duration



 Confirmed by Karel Timmosn ([id number]) on 2019 6:50:05 PM









 Performing Organization  Address  City/State/Zipcode  Phone Number

 

 [employer]      



after 2018



Insurance







 Payer  Benefit Plan /  Subscriber ID  Type  Phone  Address



   Group        

 

 BLUE CROSS/BLUE  BCBS PPO POS EPO  xxxxxxxxxxxx  PPO  [phone]  [id number]







 [place]



           [address]









 Guarantor Name  Account Type  Relation to  Date of  Phone  Billing



     Patient  Birth    Address

 

 Lina Chaparro  Personal/Family  Self  1975  [phone]        (Home)  [place]



           [address]







Advance Directives

For more information, please contact:[address] [phone]





 Code Status  Date Activated  Date Inactivated  Comments

 

 Full Code  2/10/2019  9:58 PM    









 This code status was determined by:  Patient

## 2019-03-14 NOTE — XMS REPORT
[facility]

 Created on:February 15, 2019



Patient:Lina Chaparro

Sex:Female

:1975

External Reference #:[id number]





Demographics







 Address  [address] [place] [address]

 

 Phone  [phone]

 

 Preferred Language  en

 

 Marital Status  Unknown

 

 [employer] Affiliation  Unknown

 

 Race  White

 

 Ethnic Group  Unknown









Author







 Organization  [employer]









Care Team Providers







 Name  Role  Phone

 

 Monique Brown  Provider Role  Unavailable









Allergies, Adverse Reactions, Alerts







 Substance  Reaction  Event Type

 

 Bactrim  rash  Drug Allergy







Problems







 Problem Type  Condition  Code  Onset Dates  Condition Status

 

 Problem  Thyroid lesion  E07.89    Active

 

 Problem  Anxiety  F41.9    Active

 

 Assessment  Thyroid lesion  E07.89    Active

 

 Assessment  Anxiety  F41.9    Active







Medications







 Medication  Code  Code  Instructions  Start  End  Status  Dosage



   System      Date  Date    

 

 Aspirin Adult  NDC  00949205174  81 MG Orally      Active  1 tablet



 Low Strength      Once a day        

 

 atorvastatin  NDC  [id number]  20mg      Active  1 tablet



               by mouth



               at bedtime







Results

No Known Results



Summary Purpose

[employer] Submission

## 2020-02-16 ENCOUNTER — HOSPITAL ENCOUNTER (EMERGENCY)
Dept: HOSPITAL 97 - ER | Age: 45
Discharge: HOME | End: 2020-02-16
Payer: COMMERCIAL

## 2020-02-16 VITALS — SYSTOLIC BLOOD PRESSURE: 123 MMHG | DIASTOLIC BLOOD PRESSURE: 83 MMHG | TEMPERATURE: 97.6 F | OXYGEN SATURATION: 98 %

## 2020-02-16 DIAGNOSIS — S80.02XA: ICD-10-CM
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
## 2020-02-16 DIAGNOSIS — S93.602A: Primary | ICD-10-CM
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
## 2020-02-16 DIAGNOSIS — F17.210: ICD-10-CM

## 2020-02-16 PROCEDURE — 99283 EMERGENCY DEPT VISIT LOW MDM: CPT

## 2020-02-16 NOTE — ER
Nurse's Notes                                                                                     

 [facility]                                                                 

Name: Lina Chaparro                                                                               

Age: 44 yrs                                                                                       

Sex: Female                                                                                       

: 1975                                                                                   

MRN: [id number]                                                                                   

Arrival Date: 2020                                                                          

Time: 06:20                                                                                       

Account#: [id number]                                                                            

Bed 14                                                                                            

Private MD:                                                                                       

Diagnosis: Sprain of foot;Contusion of left knee                                                  

                                                                                                  

Presentation:                                                                                     

                                                                                             

06:27 Presenting complaint: Patient states: she was at [facility] in [place] and slipped on bb  

      something on the pavement and fell bruising her left leg and foot she denies hitting        

      her head or any LOC, pt states the pain has just gotten worse and she can't sleep, she      

      took Advil 800 mg on the way here. Transition of care: patient was not received from        

      another setting of care. Onset of symptoms was 2020. Risk Assessment: Do       

      you want to hurt yourself or someone else? Patient reports no desire to harm self or        

      others. Initial Sepsis Screen: Does the patient meet any 2 criteria? No. Patient's          

      initial sepsis screen is negative. Does the patient have a suspected source of              

      infection? No. Patient's initial sepsis screen is negative. Care prior to arrival: None.    

06:27 Method Of Arrival: Ambulatory                                                           bb  

06:27 Acuity: ARAMIS 4                                                                           bb  

                                                                                                  

Historical:                                                                                       

- Allergies:                                                                                      

06:30 No Known Allergies;                                                                     bb  

- Home Meds:                                                                                      

06:30 None [Active];                                                                          bb  

- PMHx:                                                                                           

06:30 thyroid nodule (not malignant);                                                         bb  

- PSHx:                                                                                           

06:30 Cholecystectomy; Tubal ligation;                                                        bb  

                                                                                                  

- Immunization history:: Adult Immunizations up to date, Flu vaccine is not up to date.           

- Coronavirus screen:: The patient has NOT traveled to [place] in the past 14 days.                 

  Proceed with normal triage process as indicated.                                                

- Social history:: Smoking status: Patient reports the use of cigarette tobacco                   

  products, smokes one pack cigarettes per day. Patient uses alcohol, occasionally.               

- Ebola Screening: : No symptoms or risks identified at this time.                                

                                                                                                  

                                                                                                  

Screenin:30 Abuse screen: Denies threats or abuse. Denies injuries from another. Nutritional        rr5 

      screening: No deficits noted. Tuberculosis screening: No symptoms or risk factors           

      identified. Fall Risk Fall in past 12 months (25 points). Gait- Impaired (20 pts.).         

      Total Blount Fall Scale indicates High Risk Score (45 or more points). Fall prevention       

      measures have been instituted. Side Rails Up X 2 Frequent Obs/Assessments Occuring As       

      available patient and family educated on Fall Prevention Program and Strategies.            

                                                                                                  

Assessment:                                                                                       

06:30 General: Appears in no apparent distress. uncomfortable, Behavior is calm, cooperative, rr5 

      appropriate for age.                                                                        

06:30 Pain: Complains of pain in left lateral ankle, left knee and anterior aspect of left    rr5 

      ankle Pain radiates to left leg Pain currently is 8 out of 10 on a pain scale. Quality      

      of pain is described as aching, Pain began suddenly, Is intermittent. Neuro: Level of       

      Consciousness is awake, alert, obeys commands, Oriented to person, place, time,             

      situation, Appropriate for age. Cardiovascular: Capillary refill < 3 seconds Patient's      

      skin is warm and dry. Respiratory: Airway is patent Respiratory effort is even,             

      unlabored, Respiratory pattern is regular, symmetrical. GI: No signs and/or symptoms        

      were reported involving the gastrointestinal system. : No signs and/or symptoms were      

      reported regarding the genitourinary system. EENT: No signs and/or symptoms were            

      reported regarding the EENT system. Derm: Skin is intact, Skin temperature is warm          

      Bruising that is dark purple, on left knee. Musculoskeletal: Swelling present in left       

      lateral ankle and anterior aspect of left ankle.                                            

07:00 Reassessment: Patient appears in no apparent distress at this time. Pain: Complains of  jl7 

      pain in dorsum of left foot and left knee Pain currently is 7 out of 10 on a pain           

      scale. Neuro: Oriented to person, place, time, situation. Cardiovascular: Patient's         

      skin is warm and dry. Respiratory: Airway is patent Respiratory effort is even,             

      unlabored, Respiratory pattern is regular, symmetrical. Derm: Skin is pink, warm \T\ dry.   

      Bruising that is dark purple, on dorsum of left foot and left knee.                         

07:30 Reassessment: Patient appears in no apparent distress at this time. Patient states      jl7 

      feeling better.                                                                             

                                                                                                  

Vital Signs:                                                                                      

06:30  / 83; Pulse 111; Resp 16 S; Temp 97.6(O); Pulse Ox 98% on R/A; Weight 83.91 kg   bb  

      (R); Height 5 ft. 7 in. (170.18 cm) (R); Pain 8/10;                                         

06:30 Body Mass Index 28.97 (83.91 kg, 170.18 cm)                                             bb  

                                                                                                  

ED Course:                                                                                        

06:20 Patient arrived in ED.                                                                  ag3 

06:21 Mu Francis PA is PHCP.                                                               jr8 

06:21 Darius Hines MD is Attending Physician.                                              jr8 

06:29 Triage completed.                                                                       bb  

06:30 Arm band placed on Patient placed in an exam room, on a stretcher, on pulse oximetry.   bb  

      Affected limb iced.                                                                         

06:30 Patient has correct armband on for positive identification. Bed in low position. Call   rr5 

      light in reach. Pulse ox on. NIBP on.                                                       

06:51 Michael Ingram, RN is Primary Nurse.                                                    rr5 

07:55 No provider procedures requiring assistance completed. Patient did not have IV access   jl7 

      during this emergency room visit.                                                           

                                                                                                  

Administered Medications:                                                                         

07:00 Drug: Norco 10 mg-325 mg 1 tabs {Note: rass 0.} Route: PO;                              rr5 

07:30 Follow up: Response: No adverse reaction; Pain is decreased                             jl7 

                                                                                                  

                                                                                                  

Outcome:                                                                                          

07:33 Discharge ordered by MD.                                                                jr8 

07:55 Discharged to home ambulatory, with crutches, with family.                              jl7 

07:55 Condition: stable                                                                           

07:55 Discharge instructions given to patient, family, Instructed on discharge instructions,      

      follow up and referral plans. medication usage, Demonstrated understanding of               

      instructions, follow-up care, medications, Prescriptions given X 2.                         

08:02 Patient left the ED.                                                                    jl7 

                                                                                                  

Signatures:                                                                                       

Kimberlee Orozco, RN                     RN   Mu Pedersen PA PA   jr8                                                  

Karl Orta RN RN   jl7                                                  

Poly Lord                                 3                                                  

Michael Ingram, RN                      RN   rr5                                                  

                                                                                                  

**************************************************************************************************

## 2020-02-16 NOTE — XMS REPORT
[facility]

 Created on:2019



Patient:Lina Chaparro

Sex:Female

:1975

External Reference #:[id number]





Demographics







 Address  [address] [place] [address]

 

 Phone  [phone]

 

 Preferred Language  en

 

 Marital Status  Unknown

 

 Mandaeism Affiliation  Unknown

 

 Race  White

 

 Ethnic Group  Unknown









Author







 Organization  [employer]









Care Team Providers







 Name  Role  Phone

 

 Monique Brown  Provider Role  Unavailable









Allergies

No Known Allergies



Problems







 Problem Type  Condition  Code  Onset Dates  Condition Status

 

 Problem  Thyroid lesion  E07.89    Active

 

 Problem  Anxiety  F41.9    Active

 

 Problem  Thyroid mass  E07.9    Active

 

 Problem  Hyperthyroidism  E05.90    Active

 

 Problem  Abnormal thyroid biopsy  R89.9    Active







Medications

No Known Medications



Results

No Known Results



Summary Purpose

[employer] Submission

## 2020-02-16 NOTE — XMS REPORT
[facility]

 Created on:2019



Patient:Lina Chaparro

Sex:Female

:1975

External Reference #:[id number]





Demographics







 Address  [address] [place] [address]

 

 Phone  [phone]

 

 Preferred Language  en

 

 Marital Status  Unknown

 

 Alevism Affiliation  Unknown

 

 Race  White

 

 Ethnic Group  Unknown









Author







 Organization  [employer]









Care Team Providers







 Name  Role  Phone

 

 Monique Brown  Provider Role  Unavailable









Allergies, Adverse Reactions, Alerts







 Substance  Reaction  Event Type

 

 Bactrim  rash  Drug Allergy







Problems







 Problem Type  Condition  Code  Onset Dates  Condition Status

 

 Problem  Thyroid lesion  E07.89    Active

 

 Problem  Anxiety  F41.9    Active

 

 Assessment  Thyroid lesion  E07.89    Active

 

 Assessment  Anxiety  F41.9    Active







Medications







 Medication  Code  Code  Instructions  Start  End  Status  Dosage



   System      Date  Date    

 

 Aspirin Adult  NDC  [id number]  81 MG Orally      Active  1 tablet



 Low Strength      Once a day        

 

 atorvastatin  NDC  [id number]  20mg      Active  1 tablet



               by mouth



               at bedtime







Results

No Known Results



Summary Purpose

[employer] Submission

## 2020-02-16 NOTE — XMS REPORT
[facility]

 Created on:May 2, 2019



Patient:Lina Chaparro

Sex:Female

:1975

External Reference #:[id number]





Demographics







 Address  [address] [place] [address]

 

 Phone  [phone]

 

 Preferred Language  en

 

 Marital Status  Unknown

 

 Yazdanism Affiliation  Unknown

 

 Race  White

 

 Ethnic Group  Unknown









Author







 Organization  [employer]









Care Team Providers







 Name  Role  Phone

 

 Monique Brown  Provider Role  Unavailable









Allergies

No Known Allergies



Problems







 Problem Type  Condition  Code  Onset Dates  Condition Status

 

 Problem  Thyroid lesion  E07.89    Active

 

 Problem  Anxiety  F41.9    Active

 

 Problem  Thyroid mass  E07.9    Active

 

 Problem  Hyperthyroidism  E05.90    Active

 

 Problem  Abnormal thyroid biopsy  R89.9    Active







Medications

No Known Medications



Results

No Known Results



Summary Purpose

[employer] Submission

## 2020-02-16 NOTE — EDPHYS
Physician Documentation                                                                           

 [facility]                                                                 

Name: Lina Chaparro                                                                               

Age: 44 yrs                                                                                       

Sex: Female                                                                                       

: 1975                                                                                   

MRN: [id number]                                                                                   

Arrival Date: 2020                                                                          

Time: 06:20                                                                                       

Account#: [id number]                                                                            

Bed 14                                                                                            

Private MD:                                                                                       

ED Physician Darius Hines                                                                       

HPI:                                                                                              

                                                                                             

06:32 This 44 yrs old  Female presents to ER via Ambulatory with complaints of Foot  jr8 

      Injury.                                                                                     

06:32 Onset: The symptoms/episode began/occurred acutely, last night. Modifying factors: The  jr8 

      symptoms are alleviated by nothing. the symptoms are aggravated by movement, weight         

      bearing. Associated signs and symptoms: The patient has no apparent associated signs or     

      symptoms. Severity of symptoms: At their worst the symptoms were moderate, in the           

      emergency department the symptoms are unchanged. The patient has not experienced            

      similar symptoms in the past. The patient has not recently seen a physician. Patient        

      stated that she was at [email or web address] [facility] and stepped on a ball in the street on accident            

      causing her to roll foot and fall. Pain to foot and knee since injury that is not           

      getting better .                                                                            

                                                                                                  

Historical:                                                                                       

- Allergies:                                                                                      

06:30 No Known Allergies;                                                                     bb  

- Home Meds:                                                                                      

06:30 None [Active];                                                                          bb  

- PMHx:                                                                                           

06:30 thyroid nodule (not malignant);                                                         bb  

- PSHx:                                                                                           

06:30 Cholecystectomy; Tubal ligation;                                                        bb  

                                                                                                  

- Immunization history:: Adult Immunizations up to date, Flu vaccine is not up to date.           

- Coronavirus screen:: The patient has NOT traveled to [place] in the past 14 days.                 

  Proceed with normal triage process as indicated.                                                

- Social history:: Smoking status: Patient reports the use of cigarette tobacco                   

  products, smokes one pack cigarettes per day. Patient uses alcohol, occasionally.               

- Ebola Screening: : No symptoms or risks identified at this time.                                

                                                                                                  

                                                                                                  

ROS:                                                                                              

06:32 Eyes: Negative for injury, pain, redness, and discharge, ENT: Negative for injury,      jr8 

      pain, and discharge, Neck: Negative for injury, pain, and swelling, Cardiovascular:         

      Negative for chest pain, palpitations, and edema, Respiratory: Negative for shortness       

      of breath, cough, wheezing, and pleuritic chest pain, Abdomen/GI: Negative for              

      abdominal pain, nausea, vomiting, diarrhea, and constipation, Back: Negative for injury     

      and pain, Skin: Negative for injury, rash, and discoloration, Neuro: Negative for           

      headache, weakness, numbness, tingling, and seizure.                                        

06:32 MS/extremity: Positive for ecchymosis, pain, swelling, tenderness, of the left foot and     

      knee.                                                                                       

                                                                                                  

Exam:                                                                                             

06:32 Head/Face:  Normocephalic, atraumatic. Eyes:  Pupils equal round and reactive to light, jr8 

      extra-ocular motions intact.  Lids and lashes normal.  Conjunctiva and sclera are           

      non-icteric and not injected.  Cornea within normal limits.  Periorbital areas with no      

      swelling, redness, or edema. ENT:  Nares patent. No nasal discharge, no septal              

      abnormalities noted.  Tympanic membranes are normal and external auditory canals are        

      clear.  Oropharynx with no redness, swelling, or masses, exudates, or evidence of           

      obstruction, uvula midline.  Mucous membranes moist. Neck:  Trachea midline, no             

      thyromegaly or masses palpated, and no cervical lymphadenopathy.  Supple, full range of     

      motion without nuchal rigidity, or vertebral point tenderness.  No Meningismus.             

      Chest/axilla:  Normal chest wall appearance and motion.  Nontender with no deformity.       

      No lesions are appreciated. Cardiovascular:  Regular rate and rhythm with a normal S1       

      and S2.  No gallops, murmurs, or rubs.  Normal PMI, no JVD.  No pulse deficits.             

      Respiratory:  Lungs have equal breath sounds bilaterally, clear to auscultation and         

      percussion.  No rales, rhonchi or wheezes noted.  No increased work of breathing, no        

      retractions or nasal flaring. Abdomen/GI:  Soft, non-tender, with normal bowel sounds.      

      No distension or tympany.  No guarding or rebound.  No evidence of tenderness               

      throughout. Back:  No spinal tenderness.  No costovertebral tenderness.  Full range of      

      motion. Skin:  Warm, dry with normal turgor.  Normal color with no rashes, no lesions,      

      and no evidence of cellulitis. Neuro:  Awake and alert, GCS 15, oriented to person,         

      place, time, and situation.  Cranial nerves II-XII grossly intact.  Motor strength 5/5      

      in all extremities.  Sensory grossly intact.  Cerebellar exam normal.  Normal gait.         

06:32 Musculoskeletal/extremity: Extremities: grossly normal except: noted in the left foot:      

      Patient has hematoma with moderate tenderness to left dorsal foot nearest the cuboid        

      bones. Decreased ROM secondary to pain. No other trauma noted , noted in the left knee:     

      Patient has swelling with hematoma and bruising to left knee at the infrapatellar           

      region. Tender to touch but with full ROM , Pulses: noted to be 2+ in the right radial      

      artery, right posterior tibial artery, right dorsalis pedis artery, left radial artery,     

      left posterior tibial artery and left dorsalis pedis artery, Sensation intact.              

                                                                                                  

Vital Signs:                                                                                      

06:30  / 83; Pulse 111; Resp 16 S; Temp 97.6(O); Pulse Ox 98% on R/A; Weight 83.91 kg   bb  

      (R); Height 5 ft. 7 in. (170.18 cm) (R); Pain 8/10;                                         

06:30 Body Mass Index 28.97 (83.91 kg, 170.18 cm)                                               

                                                                                                  

Procedures:                                                                                       

07:32 Splinting: Splint applied to left foot using ace wrap, applied by nurse. Examined by    jr8 

      me, post splint application: neurovascular intact, 2+ distal pulses palpable, brisk         

      capillary refill noted, Patient tolerated well. Crutch training provided to patient         

      and/or family. Return demonstration given.                                                  

                                                                                                  

MDM:                                                                                              

06:31 Patient medically screened.                                                             jr8 

07:32 Data reviewed: vital signs, nurses notes, radiologic studies, plain films, and as a     jr8 

      result, I will discharge patient. Data interpreted: Pulse oximetry: on room air is 98       

      %. Interpretation: normal. Counseling: I had a detailed discussion with the patient         

      and/or guardian regarding: the historical points, exam findings, and any diagnostic         

      results supporting the discharge/admit diagnosis, radiology results, the need for           

      outpatient follow up, a orthopedic surgeon, to return to the emergency department if        

      symptoms worsen or persist or if there are any questions or concerns that arise at home.    

                                                                                                  

                                                                                             

06:32 Order name: XRAY Foot LEFT 3 View                                                       jr8 

                                                                                             

06:32 Order name: XRAY Knee LEFT 3 view                                                       jr8 

                                                                                             

07:32 Order name: Ace wrap-joint; Complete Time: 08:00                                        jr8 

                                                                                             

07:32 Order name: Crutch Training; Complete Time: 08:00                                       jr8 

                                                                                             

07:32 Order name: Crutches; Complete Time: 08:00                                              jr8 

                                                                                                  

Administered Medications:                                                                         

07:00 Drug: Norco 10 mg-325 mg 1 tabs {Note: rass 0.} Route: PO;                              rr5 

07:30 Follow up: Response: No adverse reaction; Pain is decreased                             jl7 

                                                                                                  

                                                                                                  

Disposition:                                                                                      

                                                                                             

07:43 Co-signature as Attending Physician, Darius Hines MD I agree with the assessment and   kdr 

      plan of care.                                                                               

                                                                                                  

Disposition:                                                                                      

20 07:33 Discharged to Home. Impression: Sprain of foot, Contusion of left knee.            

- Condition is Stable.                                                                            

- Discharge Instructions: Foot Sprain, Knee Pain.                                                 

- Prescriptions for Ibuprofen 800 mg Oral Tablet - take 1 tablet by ORAL route every 12           

  hours As needed take with food; 20 tablet. Tylenol- Codeine #3 300-30 mg Oral Tablet            

  - take 2 tablets by ORAL route every 6 hours As needed; 20 tablet.                              

- Medication Reconciliation Form, Thank You Letter, Antibiotic Education, Prescription            

  Opioid Use form.                                                                                

- Follow up: Private Physician; When: 1 week; Reason: If symptoms return, Recheck                 

  today's complaints, Continuance of care, Re-evaluation by your physician.                       

- Problem is new.                                                                                 

- Symptoms have improved.                                                                         

                                                                                                  

                                                                                                  

                                                                                                  

Signatures:                                                                                       

Dispatcher [employer]                           EDMS                                                 

Darius Hines MD MD   [facility]                                                  

Kimberlee Orozco RN                     RN   bb                                                   

Mu Francis PA                        PA   jr8                                                  

Karl Orta RN                        RN   jl7                                                  

Michael Ingram RN                      RN   rr5                                                  

                                                                                                  

Corrections: (The following items were deleted from the chart)                                    

                                                                                             

08:02 07:33 2020 07:33 Discharged to Home. Impression: Sprain of foot; Contusion of     jl7 

      left knee. Condition is Stable. Forms are Medication Reconciliation Form, Thank You         

      Letter, Antibiotic Education, Prescription Opioid Use. Follow up: Private Physician;        

      When: 1 week; Reason: If symptoms return, Recheck today's complaints, Continuance of        

      care, Re-evaluation by your physician. Problem is new. Symptoms have improved. jr8          

                                                                                                  

**************************************************************************************************

## 2020-02-16 NOTE — RAD REPORT
EXAM DESCRIPTION:  RAD - Knee Left 3 View - 2/16/2020 7:25 am

 

CLINICAL HISTORY:  PAIN, trip and fall

 

COMPARISON:  No comparisons

 

FINDINGS:  No fracture, dislocation or periosteal reaction.No joint effusion seen. No joint space moises
rowing. No foreign body in the soft tissues. Soft tissues anterior to the knee joint and proximal tib
ia are thickened from edema or contusion.

 

 

IMPRESSION:  Extra articular anterior soft tissue injury evident. No acute bone or joint finding.

 

Clinical concerns for internal derangement or occult bony injury could be further assessed with MR im
aging.

## 2020-02-16 NOTE — XMS REPORT
Patient Summary Document

 Created on:2020



Patient:PONCHO PUGH

Sex:Female

:1975

External Reference #:[id number]





Demographics







 Address  [address]



   [place] [address]

 

 Home Phone  [phone]

 

 Work Phone  [phone]

 

 Email Address  NONE

 

 Preferred Language  Unknown

 

 Marital Status  Unknown

 

 Faith Affiliation  Unknown

 

 Race  Unknown

 

 Additional Race(s)  Unavailable

 

 Ethnic Group  Unknown









Author







 Organization  [facility]

 

 Address  [address]



   [place] [address]

 

 Phone  [phone]









Care Team Providers







 Name  Role  Phone

 

 TRU LEBLANC  Unavailable  Unavailable









Problems

This patient has no known problems.



Allergies, Adverse Reactions, Alerts

This patient has no known allergies or adverse reactions.



Medications

This patient has no known medications.



Results







 Test Description  Test Time  Test Comments  Text Results  Atomic Results  
Result Comments









 CT, CTANGIO  BRAIN  2019 16:32:00    FINAL REPORT PATIENT ID:   [id number] 
CTA  



       carotids and brain 2019 4:29 PM  



       CLINICAL INDICATION: TIA COMPARISON: None  



       available TECHNIQUE: Noncontrast axial CT  



       images of the head were obtained.  



       Subsequently, axial CT angiographic images  



       of the upper chest, neck, and head were  



       obtained, from which three-dimensional  



       reconstructed images were created.  



       Additional imaging series were created on  



       an independent workstation using maximum  



       intensity projection and volume rendered  



       technique. This examination was performed  



       according to our departmental dose  



       optimization program, which includes  



       automated exposure control, adjustment of  



       the mA and/or kV according to patient  



       size, and/or use of iterated  



       reconstruction technique. FINDINGS: There  



       is no intracranial hemorrhage, mass,  



       hydrocephalus, extra-axial collection, or  



       midline shift. There is no CT evidence for  



       cerebral infarction. Patient motion and  



       poor contrast bolus timing limit this  



       examination. Pathology may be obscured.  



       With these limitations in mind, there is  



       no vessel occlusion in the intracranial or  



       extracranial arterial vasculature. There  



       is no NASCET quantifiable cervical  



       internal carotid artery stenosis. There is  



       no remarkable stenosis elsewhere in the  



       intracranial or extracranial arterial  



       vasculature. There is a 27 mm mixed  



       cystic/solid lesion in the dorsal left  



       lobe of the thyroid gland. The remaining  



       visualized soft tissue and skeleton are  



       without worrisome finding. IMPRESSION: 1.  



       Limited, but otherwise unremarkable  



       intracranial and extracranial CTAs. 2.  



       Indeterminate left thyroid lesion, for  



       which further evaluation with ultrasound  



       is recommended Signed: Seipel, Timothy MDReport Verified Date/Time:  2019  



       16:32:54 Reading Location: [facility]  



       Radiology Reading Room    Electronically  



       signed by: TIMOTHY J SEIPEL, M.D. on  



       2019 04:32 PM  

 

 CT, CAROTID, ANGIO  2019 16:32:00    FINAL REPORT PATIENT ID:   [id number] 
CTA  



       carotids and brain 2019 4:29 PM  



       CLINICAL INDICATION: TIA COMPARISON: None  



       available TECHNIQUE: Noncontrast axial CT  



       images of the head were obtained.  



       Subsequently, axial CT angiographic images  



       of the upper chest, neck, and head were  



       obtained, from which three-dimensional  



       reconstructed images were created.  



       Additional imaging series were created on  



       an independent workstation using maximum  



       intensity projection and volume rendered  



       technique. This examination was performed  



       according to our departmental dose  



       optimization program, which includes  



       automated exposure control, adjustment of  



       the mA and/or kV according to patient  



       size, and/or use of iterated  



       reconstruction technique. FINDINGS: There  



       is no intracranial hemorrhage, mass,  



       hydrocephalus, extra-axial collection, or  



       midline shift. There is no CT evidence for  



       cerebral infarction. Patient motion and  



       poor contrast bolus timing limit this  



       examination. Pathology may be obscured.  



       With these limitations in mind, there is  



       no vessel occlusion in the intracranial or  



       extracranial arterial vasculature. There  



       is no NASCET quantifiable cervical  



       internal carotid artery stenosis. There is  



       no remarkable stenosis elsewhere in the  



       intracranial or extracranial arterial  



       vasculature. There is a 27 mm mixed  



       cystic/solid lesion in the dorsal left  



       lobe of the thyroid gland. The remaining  



       visualized soft tissue and skeleton are  



       without worrisome finding. IMPRESSION: 1.  



       Limited, but otherwise unremarkable  



       intracranial and extracranial CTAs. 2.  



       Indeterminate left thyroid lesion, for  



       which further evaluation with ultrasound  



       is recommended Signed: Seipel, Timothy MDReport Verified Date/Time:  2019  



       16:32:54 Reading Location: [facility]  



       Radiology Reading Room    Electronically  



       signed by: TIMOTHY J SEIPEL, M.D. on  



       2019 04:32 PM  









 HEMOGLOBIN A1C  2019 11:14:00    









   Test Item  Value  Reference Range  Comments









 HEMOGLOBIN A1C (BEAKER) (test code=368)  4.8 %  4.3-6.1  



VITAMIN B12 AND [id number] 02:22:00





 Test Item  Value  Reference Range  Comments

 

 VITAMIN B12 (BEAKER) (test code=774)  327 pg/mL  213-816  

 

 FOLATE (BEAKER) (test code=362)  11.9 ng/mL  >=7.0  



TSH/FREE T4 IF [id number] 00:10:00





 Test Item  Value  Reference Range  Comments

 

 THYROID STIMULATING HORMONE (BEAKER) (test  1.73 uIU/mL  0.35-4.94  



 code=772)      



TROPONIN -02-10 23:55:00





 Test Item  Value  Reference Range  Comments

 

 TROPONIN I (BEAKER) (test code=397)  < ng/mL  0.00-0.03  








 Test Item  Value  Reference Range  Comments

 

 MAGNESIUM (BEAKER) (test code=627)  2.0 mg/dL  1.6-2.6  



BASIC METABOLIC PANEL2019-02-10 23:49:00





 Test Item  Value  Reference Range  Comments

 

 SODIUM (BEAKER) (test  139 meq/L  136-145  



 gvzb=429)      

 

 POTASSIUM (BEAKER) (test  3.3 meq/L  3.5-5.1  



 code=379)      

 

 CHLORIDE (BEAKER) (test  106 meq/L    



 code=382)      

 

 CO2 (BEAKER) (test  24 meq/L  22-29  



 code=355)      

 

 BLOOD UREA NITROGEN  7 mg/dL  7-21  



 (BEAKER) (test code=354)      

 

 CREATININE (BEAKER) (test  0.71 mg/dL  0.57-1.25  



 code=358)      

 

 GLUCOSE RANDOM (BEAKER)  125 mg/dL    



 (test code=652)      

 

 CALCIUM (BEAKER) (test  9.2 mg/dL  8.4-10.2  



 code=697)      

 

 EGFR (BEAKER) (test  90 mL/min/1.73 sq m    ESTIMATED GFR IS NOT AS



 code=1092)      ACCURATE AS CREATININE



       CLEARANCE IN PREDICTING



       GLOMERULAR FILTRATION



       RATE. ESTIMATED GFR IS



       NOT APPLICABLE FOR



       DIALYSIS PATIENTS.



LIPID PANEL2019-02-10 23:49:00





 Test Item  Value  Reference Range  Comments

 

 TRIGLYCERIDES (BEAKER) (test code=540)  93 mg/dL    

 

 CHOLESTEROL (BEAKER) (test code=631)  214 mg/dL    

 

 HDL CHOLESTEROL (BEAKER) (test code=976)  55 mg/dL    

 

 LDL CHOLESTEROL CALCULATED (BEAKER) (test  140 mg/dL    



 code=633)      



Triglyceride Reference Range:   Low Risk         &lt;150   Borderline    150-
199   High Risk     200-499   Very High Risk  &gt;=500Cholesterol Reference 
Range:   Low Risk         &lt;200   Borderline 200-239    High Risk        &gt;
240HDL Cholesterol Reference Range:   Low Risk         &gt;=60   High Risk     
    &lt;40LDL Cholesterol Reference Range:   Optimal          &lt;100   Near 
Optimal  100-129   Borderline    130-159   High          160-189   Very High   
    &gt;=190HEPATIC FUNCTION PANEL2019-02-10 23:49:00





 Test Item  Value  Reference Range  Comments

 

 TOTAL PROTEIN (BEAKER) (test code=770)  6.8 gm/dL  6.0-8.3  

 

 ALBUMIN (BEAKER) (test code=1145)  3.8 g/dL  3.5-5.0  

 

 BILIRUBIN TOTAL (BEAKER) (test code=377)  0.7 mg/dL  0.2-1.2  

 

 BILIRUBIN DIRECT (BEAKER) (test code=706)  0.2 mg/dL  0.1-0.5  

 

 ALKALINE PHOSPHATASE (BEAKER) (test code=346)  63 U/L    

 

 AST (SGOT) (BEAKER) (test code=353)  14 U/L  5-34  

 

 ALT (SGPT) (BEAKER) (test code=347)  12 U/L  6-55  



PREGNANCY SCREEN, URINE2019-02-10 23:37:00





 Test Item  Value  Reference Range  Comments

 

 PREGNANCY TEST URINE (BEAKER) (test code=583)  Negative    



PT/APTT2019-02-10 23:36:00





 Test Item  Value  Reference Range  Comments

 

 PROTIME (BEAKER) (test code=759)  13.9 seconds  11.7-14.7  

 

 INR (BEAKER) (test code=370)  1.1  <=5.9  

 

 PARTIAL THROMBOPLASTIN TIME (BEAKER) (test  29.0 seconds  22.5-36.0  



 code=760)      



RECOMMENDED COUMADIN/WARFARIN INR THERAPY RANGESSTANDARD DOSE: 2.0 - 3.0   
Includes: PROPHYLAXIS forvenous thrombosis, systemic embolization; TREATMENT 
for venous thrombosis and/or pulmonary embolus.HIGH RISK: Target INR is 2.5-3.5 
for patients with mechanical heart valves.CBC W/PLT COUNT &amp; AUTO 
DIFFERENTIAL2019-02-10 23:27:00





 Test Item  Value  Reference Range  Comments

 

 WHITE BLOOD CELL COUNT (BEAKER) (test code=775)  7.7 K/ L  3.5-10.5  

 

 RED BLOOD CELL COUNT (BEAKER) (test code=761)  3.99 M/ L  3.93-5.22  

 

 HEMOGLOBIN (BEAKER) (test code=410)  12.3 GM/DL  11.2-15.7  

 

 HEMATOCRIT (BEAKER) (test code=411)  36.7 %  34.1-44.9  

 

 MEAN CORPUSCULAR VOLUME (BEAKER) (test code=753)  92.0 fL  79.4-94.8  

 

 MEAN CORPUSCULAR HEMOGLOBIN (BEAKER) (test  30.8 pg  25.6-32.2  



 zjke=596)      

 

 MEAN CORPUSCULAR HEMOGLOBIN CONC (BEAKER) (test  33.5 GM/DL  32.2-35.5  



 code=752)      

 

 RED CELL DISTRIBUTION WIDTH (BEAKER) (test  13.9 %  11.7-14.4  



 code=412)      

 

 PLATELET COUNT (BEAKER) (test code=756)  304 K/CU MM  150-450  

 

 MEAN PLATELET VOLUME (BEAKER) (test code=754)  9.3 fL  9.4-12.3  

 

 NUCLEATED RED BLOOD CELLS (BEAKER) (test  0 /100 WBC  0-0  



 code=413)      

 

 NEUTROPHILS RELATIVE PERCENT (BEAKER) (test  64 %    



 code=429)      

 

 LYMPHOCYTES RELATIVE PERCENT (BEAKER) (test  29 %    



 code=430)      

 

 MONOCYTES RELATIVE PERCENT (BEAKER) (test  5 %    



 code=431)      

 

 EOSINOPHILS RELATIVE PERCENT (BEAKER) (test  1 %    



 code=432)      

 

 BASOPHILS RELATIVE PERCENT (BEAKER) (test  1 %    



 code=437)      

 

 NEUTROPHILS ABSOLUTE COUNT (BEAKER) (test  4.89 K/ L  1.56-6.13  



 code=670)      

 

 LYMPHOCYTES ABSOLUTE COUNT (BEAKER) (test  2.25 K/ L  1.18-3.74  



 code=414)      

 

 MONOCYTES ABSOLUTE COUNT (BEAKER) (test  0.40 K/ L  0.24-0.36  



 code=415)      

 

 EOSINOPHILS ABSOLUTE COUNT (BEAKER) (test  0.07 K/ L  0.04-0.36  



 code=416)      

 

 BASOPHILS ABSOLUTE COUNT (BEAKER) (test  0.04 K/ L  0.01-0.08  



 code=417)      

 

 IMMATURE GRANULOCYTES-RELATIVE PERCENT (BEAKER)  1 %  0-1  



 (test code=2801)

## 2020-02-16 NOTE — XMS REPORT
[facility]

 Created on:2019



Patient:Lina Chaparro

Sex:Female

:1975

External Reference #:[id number]





Demographics







 Address  [address] [place] [address]

 

 Phone  [phone]

 

 Preferred Language  en

 

 Marital Status  Unknown

 

 Orthodoxy Affiliation  Unknown

 

 Race  White

 

 Ethnic Group  Unknown









Author







 Organization  [employer]









Care Team Providers







 Name  Role  Phone

 

 Monique Brown  Provider Role  Unavailable









Allergies

No Known Allergies



Problems







 Problem Type  Condition  Code  Onset Dates  Condition Status

 

 Problem  Thyroid lesion  E07.89    Active

 

 Problem  Anxiety  F41.9    Active

 

 Problem  Thyroid mass  E07.9    Active

 

 Assessment  Thyroid mass  E07.9    Active







Medications

No Known Medications



Results

No Known Results



Summary Purpose

[employer] Submission

## 2020-02-16 NOTE — RAD REPORT
EXAM DESCRIPTION:  RAD - Foot Left 3 View - 2/16/2020 7:25 am

 

CLINICAL HISTORY:  Left foot pain, trip and fall

 

COMPARISON:  None.

 

FINDINGS:  No fracture, dislocation or periosteal reaction. No acute or destructive bony process.  Sm
all plantar spur present.

No air or foreign body in the soft tissues.

 

IMPRESSION:  Negative left foot examination for acute finding.

## 2021-12-28 NOTE — P.HP
Date of Service: 05/19/18





PC:  This 42-year-old female experienced severe right upper quadrant abdominal 

pain radiating into her back.  She presents to the emergency room for diagnosis 

and treatment.





HPC:  Patient had sudden onset of excruciating hard abdominal pain last night.  

Doubled her over.  Came to the emergency room.  Thinks she may have had some 

similar episodes recently that were not as severe.





PMH:  Negative





PSHx:  Teeth issues necessitating removal of top and bottom, previous tubal 

ligation





SOC:  No known allergy





SYS REVIEW:  No cough, wheeze, shortness of breath.  No chest pain or 

palpitations.  No urinary complaints.  States she is otherwise in relatively 

good





O/E awake alert comfortable at the moment








HEENT:  Not jaundice





Chest:  Chest movement equal bilaterally





ABD:  Tender in the right upper quadrant





LOCO:  Intact





DATA:  Has documented gallstone





IMPRESSION:  Cholecystitis with cholelithiasis





PLAN:  I will take her to the operating room for laparoscopic possible open 

cholecystectomy with intraoperative cholangiogram.  The risks of this procedure 

have been discussed.  The possibility of bleeding, infection, injury to bile 

ducts blood vessels and intestines has been described.  The possible need for 

an open and/or further surgeries and procedures was discussed.  She understands 

and wants us to proceed. [de-identified] : 12/28/21:\par SR, NSST changes. [de-identified] : 04/16/21:\par No ischemia.